# Patient Record
Sex: MALE | Race: WHITE | NOT HISPANIC OR LATINO | Employment: OTHER | URBAN - NONMETROPOLITAN AREA
[De-identification: names, ages, dates, MRNs, and addresses within clinical notes are randomized per-mention and may not be internally consistent; named-entity substitution may affect disease eponyms.]

---

## 2021-09-25 ENCOUNTER — APPOINTMENT (EMERGENCY)
Dept: RADIOLOGY | Facility: HOSPITAL | Age: 81
DRG: 682 | End: 2021-09-25
Payer: COMMERCIAL

## 2021-09-25 ENCOUNTER — APPOINTMENT (EMERGENCY)
Dept: CT IMAGING | Facility: HOSPITAL | Age: 81
DRG: 682 | End: 2021-09-25
Payer: COMMERCIAL

## 2021-09-25 ENCOUNTER — HOSPITAL ENCOUNTER (INPATIENT)
Facility: HOSPITAL | Age: 81
LOS: 2 days | Discharge: ANOTHER HOSPITAL | DRG: 682 | End: 2021-09-27
Attending: EMERGENCY MEDICINE | Admitting: FAMILY MEDICINE
Payer: COMMERCIAL

## 2021-09-25 DIAGNOSIS — N17.9 AKI (ACUTE KIDNEY INJURY) (HCC): ICD-10-CM

## 2021-09-25 DIAGNOSIS — R13.10 DYSPHAGIA: Primary | ICD-10-CM

## 2021-09-25 DIAGNOSIS — E46 PROTEIN-CALORIE MALNUTRITION, UNSPECIFIED SEVERITY (HCC): ICD-10-CM

## 2021-09-25 DIAGNOSIS — R19.5 HEME POSITIVE STOOL: ICD-10-CM

## 2021-09-25 DIAGNOSIS — R13.10 ODYNOPHAGIA: ICD-10-CM

## 2021-09-25 PROBLEM — I25.10 CORONARY ARTERY DISEASE INVOLVING NATIVE CORONARY ARTERY OF NATIVE HEART WITHOUT ANGINA PECTORIS: Status: ACTIVE | Noted: 2021-09-25

## 2021-09-25 PROBLEM — I25.5 ISCHEMIC CARDIOMYOPATHY: Status: ACTIVE | Noted: 2021-09-25

## 2021-09-25 PROBLEM — E11.9 TYPE 2 DIABETES MELLITUS, WITHOUT LONG-TERM CURRENT USE OF INSULIN (HCC): Status: ACTIVE | Noted: 2021-09-25

## 2021-09-25 PROBLEM — J44.9 COPD (CHRONIC OBSTRUCTIVE PULMONARY DISEASE) (HCC): Status: ACTIVE | Noted: 2021-09-25

## 2021-09-25 PROBLEM — I95.9 HYPOTENSION: Status: ACTIVE | Noted: 2021-09-25

## 2021-09-25 PROBLEM — F17.200 CURRENT SMOKER: Status: ACTIVE | Noted: 2021-09-25

## 2021-09-25 PROBLEM — N18.4 ACUTE RENAL FAILURE SUPERIMPOSED ON STAGE 4 CHRONIC KIDNEY DISEASE (HCC): Status: ACTIVE | Noted: 2021-09-25

## 2021-09-25 PROBLEM — D64.9 ANEMIA: Status: ACTIVE | Noted: 2021-09-25

## 2021-09-25 LAB
ALBUMIN SERPL BCP-MCNC: 2.9 G/DL (ref 3.5–5)
ALP SERPL-CCNC: 96 U/L (ref 46–116)
ALT SERPL W P-5'-P-CCNC: 24 U/L (ref 12–78)
ANION GAP SERPL CALCULATED.3IONS-SCNC: 12 MMOL/L (ref 4–13)
AST SERPL W P-5'-P-CCNC: 16 U/L (ref 5–45)
ATRIAL RATE: 64 BPM
BASOPHILS # BLD AUTO: 0.07 THOUSANDS/ΜL (ref 0–0.1)
BASOPHILS NFR BLD AUTO: 1 % (ref 0–1)
BILIRUB SERPL-MCNC: 0.33 MG/DL (ref 0.2–1)
BUN SERPL-MCNC: 78 MG/DL (ref 5–25)
CALCIUM ALBUM COR SERPL-MCNC: 9 MG/DL (ref 8.3–10.1)
CALCIUM SERPL-MCNC: 8.1 MG/DL (ref 8.3–10.1)
CHLORIDE SERPL-SCNC: 104 MMOL/L (ref 100–108)
CO2 SERPL-SCNC: 22 MMOL/L (ref 21–32)
CREAT SERPL-MCNC: 4.41 MG/DL (ref 0.6–1.3)
EOSINOPHIL # BLD AUTO: 0.14 THOUSAND/ΜL (ref 0–0.61)
EOSINOPHIL NFR BLD AUTO: 1 % (ref 0–6)
ERYTHROCYTE [DISTWIDTH] IN BLOOD BY AUTOMATED COUNT: 15.1 % (ref 11.6–15.1)
GFR SERPL CREATININE-BSD FRML MDRD: 12 ML/MIN/1.73SQ M
GLUCOSE SERPL-MCNC: 82 MG/DL (ref 65–140)
HCT VFR BLD AUTO: 30.7 % (ref 36.5–49.3)
HGB BLD-MCNC: 10 G/DL (ref 12–17)
IMM GRANULOCYTES # BLD AUTO: 0.11 THOUSAND/UL (ref 0–0.2)
IMM GRANULOCYTES NFR BLD AUTO: 1 % (ref 0–2)
LACTATE SERPL-SCNC: 0.9 MMOL/L (ref 0.5–2)
LYMPHOCYTES # BLD AUTO: 1 THOUSANDS/ΜL (ref 0.6–4.47)
LYMPHOCYTES NFR BLD AUTO: 7 % (ref 14–44)
MCH RBC QN AUTO: 31.3 PG (ref 26.8–34.3)
MCHC RBC AUTO-ENTMCNC: 32.6 G/DL (ref 31.4–37.4)
MCV RBC AUTO: 96 FL (ref 82–98)
MONOCYTES # BLD AUTO: 0.8 THOUSAND/ΜL (ref 0.17–1.22)
MONOCYTES NFR BLD AUTO: 6 % (ref 4–12)
NEUTROPHILS # BLD AUTO: 11.89 THOUSANDS/ΜL (ref 1.85–7.62)
NEUTS SEG NFR BLD AUTO: 84 % (ref 43–75)
NRBC BLD AUTO-RTO: 0 /100 WBCS
P AXIS: 66 DEGREES
PLATELET # BLD AUTO: 276 THOUSANDS/UL (ref 149–390)
PMV BLD AUTO: 10.3 FL (ref 8.9–12.7)
POTASSIUM SERPL-SCNC: 4.8 MMOL/L (ref 3.5–5.3)
PR INTERVAL: 204 MS
PROT SERPL-MCNC: 6.2 G/DL (ref 6.4–8.2)
QRS AXIS: 2 DEGREES
QRSD INTERVAL: 160 MS
QT INTERVAL: 458 MS
QTC INTERVAL: 472 MS
RBC # BLD AUTO: 3.2 MILLION/UL (ref 3.88–5.62)
S PYO DNA THROAT QL NAA+PROBE: NORMAL
SODIUM SERPL-SCNC: 138 MMOL/L (ref 136–145)
T WAVE AXIS: 180 DEGREES
TROPONIN I SERPL-MCNC: <0.02 NG/ML
VENTRICULAR RATE: 64 BPM
WBC # BLD AUTO: 14.01 THOUSAND/UL (ref 4.31–10.16)

## 2021-09-25 PROCEDURE — 99285 EMERGENCY DEPT VISIT HI MDM: CPT | Performed by: PHYSICIAN ASSISTANT

## 2021-09-25 PROCEDURE — 99223 1ST HOSP IP/OBS HIGH 75: CPT | Performed by: FAMILY MEDICINE

## 2021-09-25 PROCEDURE — 85025 COMPLETE CBC W/AUTO DIFF WBC: CPT | Performed by: PHYSICIAN ASSISTANT

## 2021-09-25 PROCEDURE — 83605 ASSAY OF LACTIC ACID: CPT | Performed by: PHYSICIAN ASSISTANT

## 2021-09-25 PROCEDURE — 36415 COLL VENOUS BLD VENIPUNCTURE: CPT | Performed by: PHYSICIAN ASSISTANT

## 2021-09-25 PROCEDURE — 99285 EMERGENCY DEPT VISIT HI MDM: CPT

## 2021-09-25 PROCEDURE — 84484 ASSAY OF TROPONIN QUANT: CPT | Performed by: PHYSICIAN ASSISTANT

## 2021-09-25 PROCEDURE — 96360 HYDRATION IV INFUSION INIT: CPT

## 2021-09-25 PROCEDURE — 71045 X-RAY EXAM CHEST 1 VIEW: CPT

## 2021-09-25 PROCEDURE — 80053 COMPREHEN METABOLIC PANEL: CPT | Performed by: PHYSICIAN ASSISTANT

## 2021-09-25 PROCEDURE — 93005 ELECTROCARDIOGRAM TRACING: CPT

## 2021-09-25 PROCEDURE — 87651 STREP A DNA AMP PROBE: CPT | Performed by: PHYSICIAN ASSISTANT

## 2021-09-25 PROCEDURE — 70490 CT SOFT TISSUE NECK W/O DYE: CPT

## 2021-09-25 RX ORDER — UMECLIDINIUM 62.5 UG/1
1 AEROSOL, POWDER ORAL DAILY
COMMUNITY

## 2021-09-25 RX ORDER — SPIRONOLACTONE 25 MG/1
25 TABLET ORAL EVERY OTHER DAY
Status: CANCELLED | OUTPATIENT
Start: 2021-09-25

## 2021-09-25 RX ORDER — SIMVASTATIN 20 MG
20 TABLET ORAL
COMMUNITY

## 2021-09-25 RX ORDER — CARVEDILOL 25 MG/1
25 TABLET ORAL 2 TIMES DAILY WITH MEALS
COMMUNITY

## 2021-09-25 RX ORDER — GLIMEPIRIDE 2 MG/1
2 TABLET ORAL
COMMUNITY

## 2021-09-25 RX ORDER — MIRTAZAPINE 15 MG/1
7.5 TABLET, FILM COATED ORAL 2 TIMES DAILY
COMMUNITY

## 2021-09-25 RX ORDER — ISOSORBIDE MONONITRATE 120 MG/1
120 TABLET, EXTENDED RELEASE ORAL DAILY
COMMUNITY

## 2021-09-25 RX ORDER — ALBUTEROL SULFATE 2.5 MG/3ML
3 SOLUTION RESPIRATORY (INHALATION) AS NEEDED
COMMUNITY

## 2021-09-25 RX ORDER — RAMIPRIL 10 MG/1
10 CAPSULE ORAL DAILY
COMMUNITY

## 2021-09-25 RX ORDER — FERROUS SULFATE 325(65) MG
325 TABLET ORAL
COMMUNITY

## 2021-09-25 RX ORDER — ALLOPURINOL 100 MG/1
100 TABLET ORAL DAILY
COMMUNITY

## 2021-09-25 RX ORDER — FUROSEMIDE 20 MG/1
20 TABLET ORAL DAILY
COMMUNITY

## 2021-09-25 RX ORDER — PREDNISONE 20 MG/1
40 TABLET ORAL DAILY
COMMUNITY

## 2021-09-25 RX ORDER — SPIRONOLACTONE 25 MG/1
25 TABLET ORAL EVERY OTHER DAY
COMMUNITY

## 2021-09-25 RX ORDER — ERGOCALCIFEROL (VITAMIN D2) 1250 MCG
50000 CAPSULE ORAL
COMMUNITY

## 2021-09-25 RX ORDER — ASPIRIN 81 MG/1
81 TABLET ORAL DAILY
COMMUNITY

## 2021-09-25 RX ORDER — AZITHROMYCIN 250 MG/1
250 TABLET, FILM COATED ORAL EVERY 24 HOURS
COMMUNITY

## 2021-09-25 RX ORDER — LINAGLIPTIN 5 MG/1
5 TABLET, FILM COATED ORAL DAILY
COMMUNITY

## 2021-09-25 RX ORDER — CALCITRIOL 0.25 UG/1
0.25 CAPSULE, LIQUID FILLED ORAL DAILY
COMMUNITY

## 2021-09-25 RX ORDER — ACETAMINOPHEN 500 MG
500 TABLET ORAL EVERY 6 HOURS PRN
COMMUNITY

## 2021-09-25 RX ORDER — ALBUTEROL SULFATE 90 UG/1
2 AEROSOL, METERED RESPIRATORY (INHALATION) EVERY 6 HOURS PRN
COMMUNITY

## 2021-09-25 RX ORDER — NITROGLYCERIN 0.4 MG/1
0.4 TABLET SUBLINGUAL
COMMUNITY

## 2021-09-25 RX ADMIN — SODIUM CHLORIDE 1000 ML: 0.9 INJECTION, SOLUTION INTRAVENOUS at 16:54

## 2021-09-25 RX ADMIN — SODIUM CHLORIDE 500 ML: 0.9 INJECTION, SOLUTION INTRAVENOUS at 19:42

## 2021-09-25 NOTE — ED PROVIDER NOTES
History  Chief Complaint   Patient presents with    Difficulty Swallowing     pt arrives reporting increasing difficulty with swallowing over the past two weeks  family reports he is now stating that it is too painful to eat or drink  70-year-old male presents the emergency department with family for evaluation of difficulty/painful swallowing  Son reports this started approximately 2 weeks ago and has been worsening since  States he has had significantly decreased p o  Intake due to pain  Patient denies any episode of getting food stuck or the sensations of a step in the throat  He reports pain is more right-sided than left-sided  Patient with chronic cough  Current everyday smoker  Denies any shortness of breath  Reports occasional chest pain over the past week  Denies any current chest pain  Patient denies any fevers or chills  He denies any abdominal pain nausea, vomiting, diarrhea  History provided by:  Patient  Sore Throat  Location:  Right  Quality:  Unable to specify  Severity:  Severe  Onset quality:  Gradual  Duration:  2 weeks  Timing:  Constant  Progression:  Worsening  Chronicity:  New  Relieved by:  None tried  Worsened by:  Drinking, eating, smoking and swallowing  Ineffective treatments:  None tried  Associated symptoms: chest pain    Associated symptoms: no abdominal pain, no adenopathy, no chills, no cough, no drooling, no ear discharge, no ear pain, no epistaxis, no eye discharge, no fever, no headaches, no neck stiffness, no night sweats, no plugged ear sensation, no postnasal drip, no rash, no rhinorrhea, no shortness of breath, no sinus congestion, no stridor, no trouble swallowing and no voice change        Prior to Admission Medications   Prescriptions Last Dose Informant Patient Reported?  Taking?   acetaminophen (TYLENOL) 500 mg tablet   Yes No   Sig: Take 500 mg by mouth every 6 (six) hours as needed   albuterol (2 5 mg/3 mL) 0 083 % nebulizer solution   Yes No   Sig: Inhale 3 mL as needed   albuterol (PROVENTIL HFA,VENTOLIN HFA) 90 mcg/act inhaler   Yes No   Sig: Inhale 2 puffs every 6 (six) hours as needed   allopurinol (ZYLOPRIM) 100 mg tablet   Yes Yes   Sig: Take 100 mg by mouth daily   aspirin (ECOTRIN LOW STRENGTH) 81 mg EC tablet   Yes No   Sig: Take 81 mg by mouth daily   azithromycin (ZITHROMAX) 250 mg tablet   Yes Yes   Sig: Take 250 mg by mouth every 24 hours   calcitriol (ROCALTROL) 0 25 mcg capsule   Yes Yes   Sig: Take 0 25 mcg by mouth daily   carvedilol (COREG) 25 mg tablet   Yes Yes   Sig: Take 25 mg by mouth 2 (two) times a day with meals   ergocalciferol (ERGOCALCIFEROL) 1 25 MG (77056 UT) capsule   Yes Yes   Sig: Take 50,000 Units by mouth every 30 (thirty) days   ferrous sulfate 325 (65 Fe) mg tablet   Yes Yes   Sig: Take 325 mg by mouth daily with breakfast   furosemide (LASIX) 20 mg tablet   Yes Yes   Sig: Take 20 mg by mouth daily   glimepiride (AMARYL) 2 mg tablet   Yes Yes   Sig: Take 2 mg by mouth 2 (two) times a day before meals   isosorbide mononitrate (IMDUR) 120 mg 24 hr tablet   Yes Yes   Sig: Take 120 mg by mouth daily   linaGLIPtin (Tradjenta) 5 MG TABS   Yes Yes   Sig: Take 5 mg by mouth daily   mirtazapine (REMERON) 15 mg tablet   Yes Yes   Sig: Take 7 5 mg by mouth 2 (two) times a day   nitroglycerin (NITROSTAT) 0 4 mg SL tablet   Yes Yes   Sig: Place 0 4 mg under the tongue every 5 (five) minutes as needed for chest pain   predniSONE 20 mg tablet   Yes Yes   Sig: Take 40 mg by mouth daily   ramipril (ALTACE) 10 MG capsule   Yes Yes   Sig: Take 10 mg by mouth daily   simvastatin (ZOCOR) 20 mg tablet   Yes Yes   Sig: Take 20 mg by mouth daily at bedtime   spironolactone (ALDACTONE) 25 mg tablet   Yes Yes   Sig: Take 25 mg by mouth every other day   ticagrelor 60 MG   Yes Yes   Sig: Take 60 mg by mouth every 12 (twelve) hours   umeclidinium bromide (Incruse Ellipta) 62 5 mcg/inh AEPB inhaler   Yes Yes   Sig: Inhale 1 puff daily Facility-Administered Medications: None       Past Medical History:   Diagnosis Date    [facility]     COPD (chronic obstructive pulmonary disease) (HCC)     Coronary artery disease     Diabetes mellitus ([address] )     Gout     High cholesterol     Hypertension     Renal disorder        Past Surgical History:   Procedure Laterality Date    CARDIAC DEFIBRILLATOR PLACEMENT      CARDIAC SURGERY      CATARACT EXTRACTION      FEMORAL ARTERY STENT         History reviewed  No pertinent family history  I have reviewed and agree with the history as documented  E-Cigarette/Vaping     E-Cigarette/Vaping Substances     Social History     Tobacco Use    Smoking status: Current Every Day Smoker    Smokeless tobacco: Never Used   Substance Use Topics    Alcohol use: Never    Drug use: Never       Review of Systems   Constitutional: Negative  Negative for appetite change, chills, diaphoresis, fatigue, fever and night sweats  HENT: Positive for sore throat  Negative for drooling, ear discharge, ear pain, nosebleeds, postnasal drip, rhinorrhea, trouble swallowing and voice change  Eyes: Negative for discharge  Respiratory: Negative  Negative for cough, shortness of breath and stridor  Cardiovascular: Positive for chest pain  Gastrointestinal: Negative  Negative for abdominal pain  Musculoskeletal: Negative  Negative for neck stiffness  Skin: Negative  Negative for rash  Neurological: Negative  Negative for headaches  Hematological: Negative for adenopathy  All other systems reviewed and are negative  Physical Exam  Physical Exam  Vitals and nursing note reviewed  Constitutional:       General: He is not in acute distress  Appearance: Normal appearance  He is normal weight  He is ill-appearing (Chronically ill-appearing)  He is not toxic-appearing  HENT:      Head: Normocephalic and atraumatic        Right Ear: Tympanic membrane, ear canal and external ear normal       Left Ear: Tympanic membrane, ear canal and external ear normal       Nose: Nose normal  No congestion or rhinorrhea  Mouth/Throat:      Mouth: Mucous membranes are moist       Pharynx: Oropharynx is clear  Posterior oropharyngeal erythema present  No oropharyngeal exudate  Eyes:      Extraocular Movements: Extraocular movements intact  Conjunctiva/sclera: Conjunctivae normal       Pupils: Pupils are equal, round, and reactive to light  Cardiovascular:      Rate and Rhythm: Normal rate and regular rhythm  Pulmonary:      Effort: Pulmonary effort is normal       Breath sounds: Decreased breath sounds and wheezing present  Abdominal:      General: Abdomen is flat  Bowel sounds are normal  There is no distension  Palpations: Abdomen is soft  Tenderness: There is no abdominal tenderness  There is no right CVA tenderness or left CVA tenderness  Musculoskeletal:         General: No swelling or tenderness  Normal range of motion  Cervical back: Normal range of motion and neck supple  Tenderness: right side of throat  Lymphadenopathy:      Cervical: No cervical adenopathy  Skin:     General: Skin is warm and dry  Capillary Refill: Capillary refill takes less than 2 seconds  Coloration: Skin is not jaundiced  Findings: No bruising, erythema or rash  Comments: Patient is ashen in color possibly cigarette smoke soot  Family reports normal color for patient   Neurological:      General: No focal deficit present  Mental Status: He is alert and oriented to person, place, and time  Mental status is at baseline     Psychiatric:         Mood and Affect: Mood normal          Behavior: Behavior normal          Vital Signs  ED Triage Vitals [09/25/21 1621]   Temperature Pulse Respirations Blood Pressure SpO2   97 5 °F (36 4 °C) 66 18 (!) 94/44 100 %      Temp Source Heart Rate Source Patient Position - Orthostatic VS BP Location FiO2 (%)   Temporal -- -- -- --      Pain Score       Worst Possible Pain           Vitals:    09/25/21 1630 09/25/21 1700 09/25/21 1715 09/25/21 1845   BP: (!) 86/42 (!) 90/45 104/52 103/51   Pulse: 67 66 65 65         Visual Acuity      ED Medications  Medications   sodium chloride 0 9 % bolus 1,000 mL (1,000 mL Intravenous New Bag 9/25/21 1654)       Diagnostic Studies  Results Reviewed     Procedure Component Value Units Date/Time    Strep A PCR [[id number]]  (Normal) Collected: 09/25/21 1745    Lab Status: Final result Specimen: Throat Updated: 09/25/21 1827     STREP A PCR None Detected    Lactic acid [[id number]]  (Normal) Collected: 09/25/21 1655    Lab Status: Final result Specimen: Blood from Arm, Right Updated: 09/25/21 1721     LACTIC ACID 0 9 mmol/L     Narrative:      Result may be elevated if tourniquet was used during collection      Troponin I [[id number]]  (Normal) Collected: 09/25/21 1655    Lab Status: Final result Specimen: Blood from Arm, Right Updated: 09/25/21 1721     Troponin I <0 02 ng/mL     Comprehensive metabolic panel [[id number]]  (Abnormal) Collected: 09/25/21 1655    Lab Status: Final result Specimen: Blood from Arm, Right Updated: 09/25/21 1716     Sodium 138 mmol/L      Potassium 4 8 mmol/L      Chloride 104 mmol/L      CO2 22 mmol/L      ANION GAP 12 mmol/L      BUN 78 mg/dL      Creatinine 4 41 mg/dL      Glucose 82 mg/dL      Calcium 8 1 mg/dL      Corrected Calcium 9 0 mg/dL      AST 16 U/L      ALT 24 U/L      Alkaline Phosphatase 96 U/L      Total Protein 6 2 g/dL      Albumin 2 9 g/dL      Total Bilirubin 0 33 mg/dL      eGFR 12 ml/min/1 73sq m     Narrative:      [place] guidelines for Chronic Kidney Disease (CKD):     Stage 1 with normal or high GFR (GFR > 90 mL/min/1 73 square meters)    Stage 2 Mild CKD (GFR = 60-89 mL/min/1 73 square meters)    Stage 3A Moderate CKD (GFR = 45-59 mL/min/1 73 square meters)    Stage 3B Moderate CKD (GFR = 30-44 mL/min/1 73 square meters)   Stage 4 Severe CKD (GFR = 15-29 mL/min/1 73 square meters)    Stage 5 End Stage CKD (GFR <15 mL/min/1 73 square meters)  Note: GFR calculation is accurate only with a steady state creatinine    CBC and differential [[id number]]  (Abnormal) Collected: 09/25/21 1655    Lab Status: Final result Specimen: Blood from Arm, Right Updated: 09/25/21 1700     WBC 14 01 Thousand/uL      RBC 3 20 Million/uL      Hemoglobin 10 0 g/dL      Hematocrit 30 7 %      MCV 96 fL      MCH 31 3 pg      MCHC 32 6 g/dL      RDW 15 1 %      MPV 10 3 fL      Platelets 391 Thousands/uL      nRBC 0 /100 WBCs      Neutrophils Relative 84 %      Immat GRANS % 1 %      Lymphocytes Relative 7 %      Monocytes Relative 6 %      Eosinophils Relative 1 %      Basophils Relative 1 %      Neutrophils Absolute 11 89 Thousands/µL      Immature Grans Absolute 0 11 Thousand/uL      Lymphocytes Absolute 1 00 Thousands/µL      Monocytes Absolute 0 80 Thousand/µL      Eosinophils Absolute 0 14 Thousand/µL      Basophils Absolute 0 07 Thousands/µL                  CT soft tissue neck wo contrast   Final Result by Guilherme Strange MD (09/25 1805)      No acute CT findings  Emphysematous lung changes                 Workstation performed: [id number]         XR chest 1 view portable    (Results Pending)   FL barium swallow ROUTINE    (Results Pending)              Procedures  ECG 12 Lead Documentation Only    Date/Time: 9/25/2021 4:38 PM  Performed by: David Arita PA-C  Authorized by: David Arita PA-C     ECG reviewed by me, the ED Provider: yes    Patient location:  ED  Interpretation:     Interpretation: non-specific    Rate:     ECG rate:  64    ECG rate assessment: normal    Rhythm:     Rhythm: sinus rhythm    Ectopy:     Ectopy: none    QRS:     QRS axis:  Normal    QRS intervals:  Normal  Conduction:     Conduction: abnormal      Abnormal conduction: complete LBBB    ST segments:     ST segments:  Normal  T waves:     T waves: normal               ED Course  ED Course as of Sep 25 1915   Sat Sep 25, 2021   1714 WBC(!): 14 01   1730 Was 2 89 in August 2021   Creatinine(!): 4 41   1807 CT soft tissue neck: No acute CT findings  Emphysematous lung changes  [facility] PCR: None Detected   1833 Patient is agreeable with admission      1901 Patient accepted by medicine  Of note son's #: [phone]                    MDM  Number of Diagnoses or Management Options  TRACEE (acute kidney injury) [facility]): new and requires workup  Dysphagia: new and requires workup  Diagnosis management comments: 49-year-old male presents emergency department for evaluation of difficult/painful swallowing x2 weeks with unknown origin  Vitals and medical record reviewed  Everyday smoker  No fevers or chills  One episode of vomiting last week  Significant decreased p o  Intake due to throat pain  Worse on right side than left  Vitals and medical record reviewed  Patient was hypotensive did improve minimally with fluids  Lungs decreased with mild amount of wheezing  Oropharynx noted for erythema no significant swelling, exudate  No cervical adenopathy but tenderness to the right side of the neck  EKG was nonischemic  Troponin negative  CT neck negative for acute findings  Strep negative  Patient was found have leukocytosis and significant TRACEE  Discussed with Medicine who accepted the patient for admission         Amount and/or Complexity of Data Reviewed  Clinical lab tests: ordered and reviewed  Tests in the radiology section of CPT®: ordered and reviewed  Review and summarize past medical records: yes  Discuss the patient with other providers: yes  Independent visualization of images, tracings, or specimens: yes        Disposition  Final diagnoses:   Dysphagia   TRACEE (acute kidney injury) ([facility] [address] )     Time reflects when diagnosis was documented in both MDM as applicable and the Disposition within this note     Time User Action Codes Description Comment    9/25/2021  6:41 PM Puneet Johnston [R13 10] Dysphagia     9/25/2021  6:41 PM Olya Dutton [N17 9] TRACEE (acute kidney injury) [facility])       ED Disposition     ED Disposition Condition Date/Time Comment    Admit Stable Sat Sep 25, 2021  6:41 PM Case was discussed with Dr Adelina Dunbar and the patient's admission status was agreed to be Admission Status: inpatient status to the service of Dr Adelina Dunbar   Follow-up Information    None         Patient's Medications   Discharge Prescriptions    No medications on file     No discharge procedures on file      PDMP Review     None          ED Provider  Electronically Signed by           Amaury Ernst PA-C  09/25/21 3644

## 2021-09-26 ENCOUNTER — APPOINTMENT (INPATIENT)
Dept: CT IMAGING | Facility: HOSPITAL | Age: 81
DRG: 682 | End: 2021-09-26
Payer: COMMERCIAL

## 2021-09-26 ENCOUNTER — APPOINTMENT (INPATIENT)
Dept: ULTRASOUND IMAGING | Facility: HOSPITAL | Age: 81
DRG: 682 | End: 2021-09-26
Payer: COMMERCIAL

## 2021-09-26 PROBLEM — R19.5 HEME POSITIVE STOOL: Status: ACTIVE | Noted: 2021-09-26

## 2021-09-26 PROBLEM — E43 SEVERE PROTEIN-CALORIE MALNUTRITION (HCC): Status: ACTIVE | Noted: 2021-09-25

## 2021-09-26 LAB
ALBUMIN SERPL BCP-MCNC: 2.3 G/DL (ref 3.5–5)
ALP SERPL-CCNC: 77 U/L (ref 46–116)
ALT SERPL W P-5'-P-CCNC: 18 U/L (ref 12–78)
ANION GAP SERPL CALCULATED.3IONS-SCNC: 12 MMOL/L (ref 4–13)
AST SERPL W P-5'-P-CCNC: 14 U/L (ref 5–45)
BACTERIA UR QL AUTO: ABNORMAL /HPF
BASOPHILS # BLD AUTO: 0.04 THOUSANDS/ΜL (ref 0–0.1)
BASOPHILS NFR BLD AUTO: 0 % (ref 0–1)
BILIRUB SERPL-MCNC: 0.21 MG/DL (ref 0.2–1)
BILIRUB UR QL STRIP: NEGATIVE
BUN SERPL-MCNC: 66 MG/DL (ref 5–25)
CALCIUM ALBUM COR SERPL-MCNC: 8.8 MG/DL (ref 8.3–10.1)
CALCIUM SERPL-MCNC: 7.4 MG/DL (ref 8.3–10.1)
CHLORIDE SERPL-SCNC: 109 MMOL/L (ref 100–108)
CLARITY UR: CLEAR
CO2 SERPL-SCNC: 21 MMOL/L (ref 21–32)
COLOR UR: YELLOW
CREAT SERPL-MCNC: 3.46 MG/DL (ref 0.6–1.3)
CREAT UR-MCNC: 92.5 MG/DL
EOSINOPHIL # BLD AUTO: 0.1 THOUSAND/ΜL (ref 0–0.61)
EOSINOPHIL NFR BLD AUTO: 1 % (ref 0–6)
ERYTHROCYTE [DISTWIDTH] IN BLOOD BY AUTOMATED COUNT: 15.3 % (ref 11.6–15.1)
EST. AVERAGE GLUCOSE BLD GHB EST-MCNC: 128 MG/DL
FERRITIN SERPL-MCNC: 103 NG/ML (ref 8–388)
GFR SERPL CREATININE-BSD FRML MDRD: 16 ML/MIN/1.73SQ M
GLUCOSE SERPL-MCNC: 104 MG/DL (ref 65–140)
GLUCOSE SERPL-MCNC: 145 MG/DL (ref 65–140)
GLUCOSE SERPL-MCNC: 162 MG/DL (ref 65–140)
GLUCOSE SERPL-MCNC: 57 MG/DL (ref 65–140)
GLUCOSE SERPL-MCNC: 58 MG/DL (ref 65–140)
GLUCOSE SERPL-MCNC: 62 MG/DL (ref 65–140)
GLUCOSE SERPL-MCNC: 63 MG/DL (ref 65–140)
GLUCOSE SERPL-MCNC: 81 MG/DL (ref 65–140)
GLUCOSE UR STRIP-MCNC: NEGATIVE MG/DL
HBA1C MFR BLD: 6.1 %
HCT VFR BLD AUTO: 26 % (ref 36.5–49.3)
HCT VFR BLD AUTO: 27.8 % (ref 36.5–49.3)
HGB BLD-MCNC: 8.5 G/DL (ref 12–17)
HGB BLD-MCNC: 8.9 G/DL (ref 12–17)
HGB UR QL STRIP.AUTO: NEGATIVE
HYALINE CASTS #/AREA URNS LPF: ABNORMAL /LPF
IMM GRANULOCYTES # BLD AUTO: 0.08 THOUSAND/UL (ref 0–0.2)
IMM GRANULOCYTES NFR BLD AUTO: 1 % (ref 0–2)
IRON SATN MFR SERPL: 16 % (ref 20–50)
IRON SERPL-MCNC: 28 UG/DL (ref 65–175)
KETONES UR STRIP-MCNC: NEGATIVE MG/DL
LEUKOCYTE ESTERASE UR QL STRIP: NEGATIVE
LYMPHOCYTES # BLD AUTO: 0.77 THOUSANDS/ΜL (ref 0.6–4.47)
LYMPHOCYTES NFR BLD AUTO: 8 % (ref 14–44)
MCH RBC QN AUTO: 31.5 PG (ref 26.8–34.3)
MCHC RBC AUTO-ENTMCNC: 32.7 G/DL (ref 31.4–37.4)
MCV RBC AUTO: 96 FL (ref 82–98)
MONOCYTES # BLD AUTO: 0.5 THOUSAND/ΜL (ref 0.17–1.22)
MONOCYTES NFR BLD AUTO: 5 % (ref 4–12)
NEUTROPHILS # BLD AUTO: 7.77 THOUSANDS/ΜL (ref 1.85–7.62)
NEUTS SEG NFR BLD AUTO: 85 % (ref 43–75)
NITRITE UR QL STRIP: NEGATIVE
NON-SQ EPI CELLS URNS QL MICRO: ABNORMAL /HPF
NRBC BLD AUTO-RTO: 0 /100 WBCS
PH UR STRIP.AUTO: 5 [PH]
PLATELET # BLD AUTO: 216 THOUSANDS/UL (ref 149–390)
PMV BLD AUTO: 10.7 FL (ref 8.9–12.7)
POTASSIUM SERPL-SCNC: 4.4 MMOL/L (ref 3.5–5.3)
PROT SERPL-MCNC: 5.1 G/DL (ref 6.4–8.2)
PROT UR STRIP-MCNC: NEGATIVE MG/DL
RBC # BLD AUTO: 2.7 MILLION/UL (ref 3.88–5.62)
RBC #/AREA URNS AUTO: ABNORMAL /HPF
SODIUM 24H UR-SCNC: 51 MOL/L
SODIUM SERPL-SCNC: 142 MMOL/L (ref 136–145)
SP GR UR STRIP.AUTO: 1.02 (ref 1–1.03)
TIBC SERPL-MCNC: 176 UG/DL (ref 250–450)
UROBILINOGEN UR QL STRIP.AUTO: 0.2 E.U./DL
VIT B12 SERPL-MCNC: 615 PG/ML (ref 100–900)
WBC # BLD AUTO: 9.26 THOUSAND/UL (ref 4.31–10.16)
WBC #/AREA URNS AUTO: ABNORMAL /HPF

## 2021-09-26 PROCEDURE — 85018 HEMOGLOBIN: CPT | Performed by: NURSE PRACTITIONER

## 2021-09-26 PROCEDURE — 83550 IRON BINDING TEST: CPT | Performed by: FAMILY MEDICINE

## 2021-09-26 PROCEDURE — 84300 ASSAY OF URINE SODIUM: CPT | Performed by: NURSE PRACTITIONER

## 2021-09-26 PROCEDURE — 36415 COLL VENOUS BLD VENIPUNCTURE: CPT | Performed by: NURSE PRACTITIONER

## 2021-09-26 PROCEDURE — 76770 US EXAM ABDO BACK WALL COMP: CPT

## 2021-09-26 PROCEDURE — 83036 HEMOGLOBIN GLYCOSYLATED A1C: CPT | Performed by: NURSE PRACTITIONER

## 2021-09-26 PROCEDURE — 81001 URINALYSIS AUTO W/SCOPE: CPT | Performed by: NURSE PRACTITIONER

## 2021-09-26 PROCEDURE — 82948 REAGENT STRIP/BLOOD GLUCOSE: CPT

## 2021-09-26 PROCEDURE — 71250 CT THORAX DX C-: CPT

## 2021-09-26 PROCEDURE — 85025 COMPLETE CBC W/AUTO DIFF WBC: CPT | Performed by: NURSE PRACTITIONER

## 2021-09-26 PROCEDURE — 82728 ASSAY OF FERRITIN: CPT | Performed by: FAMILY MEDICINE

## 2021-09-26 PROCEDURE — 83540 ASSAY OF IRON: CPT | Performed by: FAMILY MEDICINE

## 2021-09-26 PROCEDURE — 82570 ASSAY OF URINE CREATININE: CPT | Performed by: NURSE PRACTITIONER

## 2021-09-26 PROCEDURE — C9113 INJ PANTOPRAZOLE SODIUM, VIA: HCPCS | Performed by: FAMILY MEDICINE

## 2021-09-26 PROCEDURE — 80053 COMPREHEN METABOLIC PANEL: CPT | Performed by: NURSE PRACTITIONER

## 2021-09-26 PROCEDURE — 82607 VITAMIN B-12: CPT | Performed by: FAMILY MEDICINE

## 2021-09-26 PROCEDURE — 85014 HEMATOCRIT: CPT | Performed by: NURSE PRACTITIONER

## 2021-09-26 PROCEDURE — 74176 CT ABD & PELVIS W/O CONTRAST: CPT

## 2021-09-26 PROCEDURE — 82272 OCCULT BLD FECES 1-3 TESTS: CPT | Performed by: FAMILY MEDICINE

## 2021-09-26 PROCEDURE — G1004 CDSM NDSC: HCPCS

## 2021-09-26 PROCEDURE — 99223 1ST HOSP IP/OBS HIGH 75: CPT | Performed by: NURSE PRACTITIONER

## 2021-09-26 PROCEDURE — 99233 SBSQ HOSP IP/OBS HIGH 50: CPT | Performed by: FAMILY MEDICINE

## 2021-09-26 RX ORDER — DEXTROSE MONOHYDRATE 25 G/50ML
25 INJECTION, SOLUTION INTRAVENOUS ONCE
Status: COMPLETED | OUTPATIENT
Start: 2021-09-26 | End: 2021-09-26

## 2021-09-26 RX ORDER — ACETAMINOPHEN 325 MG/1
650 TABLET ORAL EVERY 6 HOURS PRN
Status: DISCONTINUED | OUTPATIENT
Start: 2021-09-26 | End: 2021-09-27 | Stop reason: HOSPADM

## 2021-09-26 RX ORDER — ALBUTEROL SULFATE 90 UG/1
2 AEROSOL, METERED RESPIRATORY (INHALATION) EVERY 6 HOURS PRN
Status: DISCONTINUED | OUTPATIENT
Start: 2021-09-26 | End: 2021-09-27 | Stop reason: HOSPADM

## 2021-09-26 RX ORDER — CARVEDILOL 3.12 MG/1
6.25 TABLET ORAL 2 TIMES DAILY WITH MEALS
Status: DISCONTINUED | OUTPATIENT
Start: 2021-09-26 | End: 2021-09-27 | Stop reason: HOSPADM

## 2021-09-26 RX ORDER — ISOSORBIDE MONONITRATE 30 MG/1
60 TABLET, EXTENDED RELEASE ORAL DAILY
Status: DISCONTINUED | OUTPATIENT
Start: 2021-09-27 | End: 2021-09-27 | Stop reason: HOSPADM

## 2021-09-26 RX ORDER — ALLOPURINOL 100 MG/1
100 TABLET ORAL DAILY
Status: DISCONTINUED | OUTPATIENT
Start: 2021-09-26 | End: 2021-09-27 | Stop reason: HOSPADM

## 2021-09-26 RX ORDER — ISOSORBIDE MONONITRATE 30 MG/1
60 TABLET, EXTENDED RELEASE ORAL DAILY
Status: DISCONTINUED | OUTPATIENT
Start: 2021-09-27 | End: 2021-09-26

## 2021-09-26 RX ORDER — MIRTAZAPINE 15 MG/1
7.5 TABLET, FILM COATED ORAL 2 TIMES DAILY
Status: DISCONTINUED | OUTPATIENT
Start: 2021-09-26 | End: 2021-09-27 | Stop reason: HOSPADM

## 2021-09-26 RX ORDER — ISOSORBIDE MONONITRATE 30 MG/1
120 TABLET, EXTENDED RELEASE ORAL DAILY
Status: DISCONTINUED | OUTPATIENT
Start: 2021-09-26 | End: 2021-09-26

## 2021-09-26 RX ORDER — CARVEDILOL 6.25 MG/1
6.25 TABLET ORAL 2 TIMES DAILY WITH MEALS
Status: DISCONTINUED | OUTPATIENT
Start: 2021-09-26 | End: 2021-09-26

## 2021-09-26 RX ORDER — DEXTROSE AND SODIUM CHLORIDE 5; .9 G/100ML; G/100ML
50 INJECTION, SOLUTION INTRAVENOUS CONTINUOUS
Status: DISCONTINUED | OUTPATIENT
Start: 2021-09-26 | End: 2021-09-27

## 2021-09-26 RX ORDER — ALBUTEROL SULFATE 2.5 MG/3ML
2.5 SOLUTION RESPIRATORY (INHALATION) EVERY 6 HOURS PRN
Status: DISCONTINUED | OUTPATIENT
Start: 2021-09-26 | End: 2021-09-27 | Stop reason: HOSPADM

## 2021-09-26 RX ORDER — ASPIRIN 81 MG/1
81 TABLET ORAL DAILY
Status: DISCONTINUED | OUTPATIENT
Start: 2021-09-26 | End: 2021-09-27

## 2021-09-26 RX ORDER — CARVEDILOL 12.5 MG/1
25 TABLET ORAL 2 TIMES DAILY WITH MEALS
Status: DISCONTINUED | OUTPATIENT
Start: 2021-09-26 | End: 2021-09-26

## 2021-09-26 RX ORDER — SODIUM CHLORIDE, SODIUM GLUCONATE, SODIUM ACETATE, POTASSIUM CHLORIDE, MAGNESIUM CHLORIDE, SODIUM PHOSPHATE, DIBASIC, AND POTASSIUM PHOSPHATE .53; .5; .37; .037; .03; .012; .00082 G/100ML; G/100ML; G/100ML; G/100ML; G/100ML; G/100ML; G/100ML
50 INJECTION, SOLUTION INTRAVENOUS CONTINUOUS
Status: DISCONTINUED | OUTPATIENT
Start: 2021-09-26 | End: 2021-09-26

## 2021-09-26 RX ORDER — PANTOPRAZOLE SODIUM 40 MG/1
40 INJECTION, POWDER, FOR SOLUTION INTRAVENOUS EVERY 12 HOURS SCHEDULED
Status: DISCONTINUED | OUTPATIENT
Start: 2021-09-26 | End: 2021-09-27 | Stop reason: HOSPADM

## 2021-09-26 RX ORDER — HEPARIN SODIUM 5000 [USP'U]/ML
5000 INJECTION, SOLUTION INTRAVENOUS; SUBCUTANEOUS EVERY 8 HOURS SCHEDULED
Status: DISCONTINUED | OUTPATIENT
Start: 2021-09-26 | End: 2021-09-26

## 2021-09-26 RX ADMIN — ISOSORBIDE MONONITRATE 120 MG: 30 TABLET, EXTENDED RELEASE ORAL at 08:48

## 2021-09-26 RX ADMIN — MIRTAZAPINE 7.5 MG: 15 TABLET, FILM COATED ORAL at 21:18

## 2021-09-26 RX ADMIN — PANTOPRAZOLE SODIUM 40 MG: 40 INJECTION, POWDER, FOR SOLUTION INTRAVENOUS at 11:54

## 2021-09-26 RX ADMIN — ASPIRIN 81 MG: 81 TABLET, COATED ORAL at 08:48

## 2021-09-26 RX ADMIN — DEXTROSE AND SODIUM CHLORIDE 50 ML/HR: 5; .9 INJECTION, SOLUTION INTRAVENOUS at 18:41

## 2021-09-26 RX ADMIN — DEXTROSE MONOHYDRATE 25 ML: 25 INJECTION, SOLUTION INTRAVENOUS at 07:49

## 2021-09-26 RX ADMIN — PANTOPRAZOLE SODIUM 40 MG: 40 INJECTION, POWDER, FOR SOLUTION INTRAVENOUS at 21:20

## 2021-09-26 RX ADMIN — DEXTROSE MONOHYDRATE 25 ML: 500 INJECTION PARENTERAL at 18:30

## 2021-09-26 RX ADMIN — IPRATROPIUM BROMIDE 1 PUFF: 17 AEROSOL, METERED RESPIRATORY (INHALATION) at 08:49

## 2021-09-26 RX ADMIN — Medication 16 G: at 18:08

## 2021-09-26 RX ADMIN — ALLOPURINOL 100 MG: 100 TABLET ORAL at 08:48

## 2021-09-26 RX ADMIN — TICAGRELOR 60 MG: 60 TABLET ORAL at 21:20

## 2021-09-26 RX ADMIN — HEPARIN SODIUM 5000 UNITS: 5000 INJECTION INTRAVENOUS; SUBCUTANEOUS at 06:37

## 2021-09-26 RX ADMIN — IPRATROPIUM BROMIDE 1 PUFF: 17 AEROSOL, METERED RESPIRATORY (INHALATION) at 21:20

## 2021-09-26 RX ADMIN — IOHEXOL 50 ML: 240 INJECTION, SOLUTION INTRATHECAL; INTRAVASCULAR; INTRAVENOUS; ORAL at 13:30

## 2021-09-26 RX ADMIN — MIRTAZAPINE 7.5 MG: 15 TABLET, FILM COATED ORAL at 08:49

## 2021-09-26 RX ADMIN — SODIUM CHLORIDE, SODIUM GLUCONATE, SODIUM ACETATE, POTASSIUM CHLORIDE, MAGNESIUM CHLORIDE, SODIUM PHOSPHATE, DIBASIC, AND POTASSIUM PHOSPHATE 50 ML/HR: .53; .5; .37; .037; .03; .012; .00082 INJECTION, SOLUTION INTRAVENOUS at 04:02

## 2021-09-26 RX ADMIN — CARVEDILOL 25 MG: 12.5 TABLET, FILM COATED ORAL at 07:49

## 2021-09-26 RX ADMIN — TICAGRELOR 60 MG: 60 TABLET ORAL at 13:29

## 2021-09-26 NOTE — ASSESSMENT & PLAN NOTE
· Creatinine 4 41  Was 2 89 in August 2021   Suspect multifactorial due to decreased oral intake and diuretic/ACE-inhibitor use  · Isolyte 50 mL/hr  · Recheck metabolic panel and trend creatinine  · Avoid nephrotoxic agents, hypotension  · Nephrology consult

## 2021-09-26 NOTE — ASSESSMENT & PLAN NOTE
· Pressure was low yesterday in the 80s  Received IV fluid boluses    Still low normal   Continue gentle hydration and decreased doses of antihypertensive medications and hold Lasix and ACE-inhibitor

## 2021-09-26 NOTE — ASSESSMENT & PLAN NOTE
· History of ICD  · EF from last echo in records 35%  · Continue isosorbide, carvedilol, ticagrelor  · Diuretics and ACE-inhibitor on hold due to TRACEE

## 2021-09-26 NOTE — PROGRESS NOTES
[address]  Progress Note - Emyradha Going [birth year], 80 y o  male MRN: [id number]  Unit/Bed#: ED 08 Encounter: [id number]  Primary Care Provider: Jacques Olivo MD   Date and time admitted to hospital: 9/25/2021  4:16 PM    * Acute renal failure superimposed on stage 4 chronic kidney disease ([facility] [address] )  Assessment & Plan  · Creatinine 4 41  Was 2 89 in August 2021  Suspect multifactorial due to decreased oral intake and diuretic/ACE-inhibitor use  · Isolyte 50 mL/hr  · Recheck metabolic panel and trend creatinine  · Avoid nephrotoxic agents, hypotension  · Nephrology consult    Odynophagia  Assessment & Plan  · Reports pain with swallowing in the lower neck/upper chest   Currently tolerating liquids with no problems  · Patient has lost around 12-15 lb over the last few weeks due to decreased oral intake  · Strep A: negative  · Upon exam patient sitting upright and eating a burrito and barbecue sandwich  · CT soft tissue neck:  No acute CT findings  · Speech eval and GI eval  · Will do CT chest abdomen pelvis for further evaluation as unable to get barium swallow due to no radiologist in department for 48 hours  · Keep NPO after midnight and plan for potential EGD in the morning    Hypotension  Assessment & Plan  · Pressure was low yesterday in the 80s  Received IV fluid boluses    Still low normal   Continue gentle hydration and decreased doses of antihypertensive medications and hold Lasix and ACE-inhibitor    Current smoker  Assessment & Plan  · Declines patch  · Encourage cessation    Type 2 diabetes mellitus, without long-term current use of insulin [facility])  Assessment & Plan  No results found for: HGBA1C    Recent Labs     09/26/21  0636 09/26/21  0820 09/26/21  0905   POCGLU 58* 145* 104       Blood Sugar Average: Last 72 hrs:  (P) 656 [id number]   · NPO  · Fingerstick blood sugar every 6 hours with sliding scale coverage  · Check hemoglobin A1c      Severe protein-calorie malnutrition [facility])  Assessment & Plan  Malnutrition Findings:           BMI Findings: Body mass index is 19 96 kg/m²  · As evidenced by insufficient protein calorie intake and muscle wasting  · Nutrition consult    Ischemic cardiomyopathy  Assessment & Plan  · History of ICD  · EF from last echo in records 35%  · Continue isosorbide, carvedilol, ticagrelor  Decrease dose of Coreg and isosorbide due to hypotension  · Diuretics and ACE-inhibitor on hold due to TRACEE    COPD (chronic obstructive pulmonary disease) ([facility])  Assessment & Plan  · Does not appear to be in exacerbation  · Patient has just completed prednisone 40 mg-suspect WBC count of 14 is in part due to this  · Continue home medications  · Respiratory protocol    Acute on chronic anemia  Assessment & Plan  ·  baseline appears to be around 13  · He had an iron panel at [facility] in July which is relatively unremarkable  · Hemoglobin 10 on presentation now dropped to 8 5  · Continue iron when no longer NPO  · Order stool Hemoccult and iron panel and B12 level  · Only transfuse if hemoglobin drops less than 7 5  Placed on Protonix 40 mg IV b i d       VTE Pharmacologic Prophylaxis:   Pharmacologic: Pharmacologic VTE Prophylaxis contraindicated due to acute on chronic anemia  Mechanical VTE Prophylaxis in Place: Yes    Patient Centered Rounds: I have performed bedside rounds with nursing staff today  Discussions with Specialists or Other Care Team Provider:  None    Education and Discussions with Family / Patient:  Discussed with patient at bedside    Time Spent for Care: 45 minutes  More than 50% of total time spent on counseling and coordination of care as described above      Current Length of Stay: 1 day(s)    Current Patient Status: Inpatient   Certification Statement: The patient will continue to require additional inpatient hospital stay due to Acute kidney injury    Discharge Plan:  Pending progress    Code Status: Level 1 - Full Code      Subjective:   Patient denies any chest pain however complains of difficulty swallowing and feels like the food is getting stuck in the lower part of his neck and upper chest   feels a little bit better today compared to before    Objective:     Vitals:   Temp (24hrs), Av 5 °F (36 4 °C), Min:97 5 °F (36 4 °C), Max:97 5 °F (36 4 °C)    Temp:  [97 5 °F (36 4 °C)] 97 5 °F (36 4 °C)  HR:  [65-80] 72  Resp:  [17-22] 18  BP: ()/(42-83) 111/51  SpO2:  [95 %-100 %] 97 %  Body mass index is 19 96 kg/m²  Input and Output Summary (last 24 hours): Intake/Output Summary (Last 24 hours) at 2021 1051  Last data filed at 2021 2042  Gross per 24 hour   Intake 1500 ml   Output --   Net 1500 ml       Physical Exam:     Physical Exam  Vitals and nursing note reviewed  Constitutional:       Appearance: He is ill-appearing  Comments: Severe generalized muscle wasting noted   HENT:      Head: Normocephalic and atraumatic  Right Ear: External ear normal       Left Ear: External ear normal       Nose: Nose normal       Mouth/Throat:      Pharynx: Oropharynx is clear  Eyes:      Pupils: Pupils are equal, round, and reactive to light  Cardiovascular:      Rate and Rhythm: Normal rate and regular rhythm  Heart sounds: Normal heart sounds  Pulmonary:      Effort: Pulmonary effort is normal       Breath sounds: Normal breath sounds  Abdominal:      General: Bowel sounds are normal       Palpations: Abdomen is soft  Tenderness: There is no abdominal tenderness  Musculoskeletal:         General: Normal range of motion  Cervical back: Normal range of motion and neck supple  Skin:     General: Skin is warm and dry  Capillary Refill: Capillary refill takes less than 2 seconds  Neurological:      General: No focal deficit present  Mental Status: He is alert and oriented to person, place, and time     Psychiatric:         Mood and Affect: Mood normal  Additional Data:     Labs:    Results from last 7 days   Lab Units 09/26/21  0539   WBC Thousand/uL 9 26   HEMOGLOBIN g/dL 8 5*   HEMATOCRIT % 26 0*   PLATELETS Thousands/uL 216   NEUTROS PCT % 85*   LYMPHS PCT % 8*   MONOS PCT % 5   EOS PCT % 1     Results from last 7 days   Lab Units 09/26/21  0539   SODIUM mmol/L 142   POTASSIUM mmol/L 4 4   CHLORIDE mmol/L 109*   CO2 mmol/L 21   BUN mg/dL 66*   CREATININE mg/dL 3 46*   ANION GAP mmol/L 12   CALCIUM mg/dL 7 4*   ALBUMIN g/dL 2 3*   TOTAL BILIRUBIN mg/dL 0 21   ALK PHOS U/L 77   ALT U/L 18   AST U/L 14   GLUCOSE RANDOM mg/dL 81         Results from last 7 days   Lab Units 09/26/21  0905 09/26/21  0820 09/26/21  0636   POC GLUCOSE mg/dl 104 145* 58*         Results from last 7 days   Lab Units 09/25/21  1655   LACTIC ACID mmol/L 0 9           * I Have Reviewed All Lab Data Listed Above  * Additional Pertinent Lab Tests Reviewed:  [address] Admission Reviewed    Imaging:    Imaging Reports Reviewed Today Include: ct neck  Imaging Personally Reviewed by Myself Includes:  Ordered CT chest abdomen pelvis    Recent Cultures (last 7 days):           Last 24 Hours Medication List:   Current Facility-Administered Medications   Medication Dose Route Frequency Provider Last Rate    acetaminophen  650 mg Oral Q6H PRN MICAH Decker      albuterol  2 puff Inhalation Q6H PRN MICAH Decker      albuterol  2 5 mg Nebulization Q6H PRN MICAH Decker      allopurinol  100 mg Oral Daily MICAH Decker      aspirin  81 mg Oral Daily MICAH Decker      carvedilol  6 25 mg Oral BID With Meals Kenya Jaimes MD      heparin (porcine)  5,000 Units Subcutaneous [facility] MICAH Decker      insulin lispro  1-5 Units Subcutaneous Q6H [facility] MICAH Decker      ipratropium  1 puff Inhalation Q12H [address] [place] [address] Avenue, CRNP      [START ON 9/27/2021] isosorbide mononitrate  60 mg Oral Daily Kenya Jaimes MD  mirtazapine  7 5 mg Oral BID MICAH Decker      multi-electrolyte  50 mL/hr Intravenous Continuous Anna MICAH Mittal 50 mL/hr (09/26/21 0402)    ticagrelor  60 mg Oral Q12H [address], CRNP          Today, Patient Was Seen By: Kenya Jaimes MD    ** Please Note: Dictation voice to text software may have been used in the creation of this document   **

## 2021-09-26 NOTE — ED NOTES
Dr Wallace Marin made aware of patients hypoglycemia and that we are following the protocol        Bhaskar Fragoso RN  09/26/21 1374

## 2021-09-26 NOTE — ED NOTES
Left message on both spouse's and son's amach asking if she could bring in pt's medications rx ticagrelor 60mg tab per pharmacy request due to med not available inhouse          Alia Guerrier RN  09/26/21 3386

## 2021-09-26 NOTE — ASSESSMENT & PLAN NOTE
· Reports pain with swallowing  · Strep A: negative  · Upon exam patient sitting upright and eating a burrito and barbecue sandwich  Advised of recommended NPO status  · CT soft tissue neck:  No acute CT findings    · Barium swallow ordered in ER-pending  · Speech eval  · NPO  · Aspiration precautions

## 2021-09-26 NOTE — ASSESSMENT & PLAN NOTE
· Reports pain with swallowing in the lower neck/upper chest   Currently tolerating liquids with no problems  · Patient has lost around 12-15 lb over the last few weeks due to decreased oral intake  · Strep A: negative  · Upon exam patient sitting upright and eating a burrito and barbecue sandwich  · CT soft tissue neck:  No acute CT findings  · Speech eval and GI eval  · Will do CT chest abdomen pelvis for further evaluation as unable to get barium swallow due to no radiologist in department for 48 hours    · Keep NPO after midnight and plan for potential EGD in the morning

## 2021-09-26 NOTE — ASSESSMENT & PLAN NOTE
· Does not appear to be in exacerbation  · Patient has just completed prednisone 40 mg-suspect WBC count of 14 is in part due to this  · Continue home medications  · Respiratory protocol

## 2021-09-26 NOTE — CONSULTS
Deborah Delgado Tankred 80 y o  male MRN: [id number]  Unit/Bed#: ED 08 Encounter: [id number]        Assessment and Plan:    1  Acute kidney injury (POA) atop chronic kidney disease  · Presented with a creatinine of 4 4 mg/dL -> 3 4 mg/dL today  Etiology likely prerenal azotemia in the setting of volume depletion, +/- failure to autoregulate in the setting of ramipril and spironolactone use  No imaging to review  No urine studies reviewed  · Plan:  · Obtain renal ultrasound, urine chemistries, bladder scan  · Continue gentle volume expansion with Isolyte  · Continue to hold Lasix, spironolactone, ramipril  · Avoid potential nephrotoxins including contrast, maximize hemodynamics with mean arterial pressure goal greater than 60 mmHg  2  Stage 4 chronic kidney disease with baseline creatinine suspected around 2 2-2 6 mg/dL  · Follows with [facility]  3  Volume depletion with hypotension  · Spironolactone and Lasix remains appropriately on hold  Continue gentle volume expansion with Isolyte until able to eat and drink appropriately  4  Type 2 diabetes mellitus  · Oral agents on hold, care according to primary team, no proteinuria noted on UA  5  Ischemic cardiomyopathy with LVEF 35%  · Hold Ace inhibitor  Gently volume expand and monitor for signs of volume overload  6  Anemia  · Hemoglobin down trending, likely hemodilution all  Transfuse for hemoglobin less than 7 0 grams/deciliter  HPI:    Kaleigh Banegas is a 80 y o  male with an active problem list significant for CKD 4, type 2 diabetes mellitus, current smoker, ischemic cardiomyopathy with an LVEF 48%, chronic systolic congestive heart failure, hypertension, COPD who presented to [address] [place] [address] emergency department 9/25 with chief complaint of decreased oral intake, difficulty swallowing  Upon arrival noncontrast CT neck negative  Creatinine 4 4 mg/dL prompting a renal consultation    He was provided with volume expansion  Upon discussion with the patient, he relates HPI as above however he has very poor historian  He states that he has been having trouble swallowing however it seems better now  He told me to call his son who is a paramedic for more details  Per patient's son, José Benton, patient has been having decreased oral intake for the last 2-3 weeks and difficulty swallowing  More recently it has been painful to swallow  Two weeks ago the patient was treated for an upper respiratory infection with azithromycin  Per patient's son he reports that patient has chronic kidney disease stage 4 and follows with [facility]  He does not know of any family history of chronic kidney disease  Patient does not take NSAIDs  Patient does take ACE-inhibitor  Limited labs to review but suspect creatinine baseline is in the 2s  Reason for Consult:  Acute kidney injury atop chronic kidney disease    Review of Systems:  A complete 10-point review of systems was performed  Aside from what was mentioned in the HPI, it is otherwise negative  Historical Information   Past Medical History:   Diagnosis Date    [facility]     COPD [facility] disease) [facility]     Coronary artery disease     Diabetes mellitus [facility] [address] )     Gout     High cholesterol     Hypertension     Renal disorder      Past Surgical History:   Procedure Laterality Date    CARDIAC DEFIBRILLATOR PLACEMENT      CARDIAC SURGERY      CATARACT EXTRACTION      FEMORAL ARTERY STENT       Social History   Social History     Substance and Sexual Activity   Alcohol Use Never     Social History     Substance and Sexual Activity   Drug Use Never     Social History     Tobacco Use   Smoking Status Current Every Day Smoker   Smokeless Tobacco Never Used       Family History:   History reviewed  No pertinent family history      Medications:  Pertinent medications were reviewed  Current Facility-Administered Medications   Medication Dose Route Frequency Provider Last Rate    acetaminophen  650 mg Oral Q6H PRN Huma Giovanna, CRNP      albuterol  2 puff Inhalation Q6H PRN Huma Giovanna, CRNP      albuterol  2 5 mg Nebulization Q6H PRN Huma Giovanna, CRNP      allopurinol  100 mg Oral Daily Huma Giovanna, CRNP      aspirin  81 mg Oral Daily Huma Giovanna, CRNP      carvedilol  25 mg Oral BID With Meals Huma Heredia, CRNP      heparin (porcine)  5,000 Units Subcutaneous [facility] Giov[facility], CRNP      insulin lispro  1-5 Units Subcutaneous Q6H [address] [facility], CRNP      ipratropium  1 puff Inhalation Q12H [address] [facility], CRNP      isosorbide mononitrate  120 mg Oral Daily Huma Giovanna, CRNP      mirtazapine  7 5 mg Oral BID Huma Giovanna, CRNP      multi-electrolyte  50 mL/hr Intravenous Continuous Huma Giovanna, CRNP 50 mL/hr (09/26/21 0402)    ticagrelor  60 mg Oral Q12H [address] [facility], CRNP           No Known Allergies      Vitals:   /53 (BP Location: Right arm)   Pulse 79   Temp 97 5 °F (36 4 °C) (Temporal)   Resp 19   Ht 5' 9" (1 753 m)   Wt 61 3 kg (135 lb 2 3 oz)   SpO2 99%   BMI 19 96 kg/m²   Body mass index is 19 96 kg/m²    SpO2: 99 %,   SpO2 Activity: At Rest,   O2 Device: None (Room air)      Intake/Output Summary (Last 24 hours) at 9/26/2021 0948  Last data filed at 9/25/2021 2042  Gross per 24 hour   Intake 1500 ml   Output --   Net 1500 ml     Invasive Devices     Peripheral Intravenous Line            Peripheral IV 09/25/21 Right Antecubital <1 day                Physical Exam:  General: conscious, cooperative, in no acute distress  Eyes: conjunctivae pink, anicteric sclerae  ENT: lips and mucous membranes moist  Neck: supple, no JVD, no masses  Chest: essentially clear breath sounds bilateral, no crackles, ronchus or wheezings  CVS: S1 & S2, normal rate, regular rhythm  Abdomen: soft, non-tender, non-distended, normoactive bowel sounds  Extremities: thin, no edema of both legs  Skin: no rash  Neuro: awake, alert, oriented      Diagnostic Data:  Lab: I have personally reviewed pertinent lab results  ,   CBC:  Results from last 7 days   Lab Units 09/26/21  0539   WBC Thousand/uL 9 26   HEMOGLOBIN g/dL 8 5*   HEMATOCRIT % 26 0*   PLATELETS Thousands/uL 216      CMP:   Lab Results   Component Value Date    SODIUM 142 09/26/2021    K 4 4 09/26/2021     (H) 09/26/2021    CO2 21 09/26/2021    BUN 66 (H) 09/26/2021    CREATININE 3 46 (H) 09/26/2021    CALCIUM 7 4 (L) 09/26/2021    AST 14 09/26/2021    ALT 18 09/26/2021    ALKPHOS 77 09/26/2021    EGFR 16 09/26/2021   ,   PT/INR: No results found for: PT, INR,   Magnesium: No components found for: MAG,  Phosphorous: No results found for: PHOS    Microbiology:  @LABRCNTIP,(urinecx:7)@        MICAH Cosme    Portions of the record may have been created with voice recognition software  Occasional wrong word or "sound a like" substitutions may have occurred due to the inherent limitations of voice recognition software  Read the chart carefully and recognize, using context, where substitutions have occurred

## 2021-09-26 NOTE — ASSESSMENT & PLAN NOTE
· Hemoglobin 10   His baseline appears to be around 13  · He had an iron panel at [facility] in July which is relatively unremarkable  · Trend hemoglobin  · Continue iron when no longer NPO

## 2021-09-26 NOTE — H&P
[address] Milner  H&P- Smitha Ortiz [birth year], 80 y o  male MRN: [id number]  Unit/Bed#: ED 08 Encounter: [id number]  Primary Care Provider: Curt Ozuna MD   Date and time admitted to hospital: 9/25/2021  4:16 PM    * Acute renal failure superimposed on stage 4 chronic kidney disease ([facility] )  Assessment & Plan  · Creatinine 4 41  Was 2 89 in August 2021  Suspect multifactorial due to decreased oral intake and diuretic/ACE-inhibitor use  · Isolyte 50 mL/hr  · Recheck metabolic panel and trend creatinine  · Avoid nephrotoxic agents, hypotension  · Nephrology consult    Dysphagia  Assessment & Plan  · Reports pain with swallowing  · Strep A: negative  · Upon exam patient sitting upright and eating a burrito and barbecue sandwich  Advised of recommended NPO status  · CT soft tissue neck:  No acute CT findings  · Barium swallow ordered in ER-pending  · Speech eval  · NPO  · Aspiration precautions    Hypotension  Assessment & Plan  · BP soft  · Medications with hold parameters  · Maintain gentle hydration  · Monitor blood pressure    Protein-calorie malnutrition (HCC)  Assessment & Plan  Malnutrition Findings:           BMI Findings: Body mass index is 19 96 kg/m²  · As evidenced by insufficient protein calorie intake and muscle wasting  · Nutrition consult    Current smoker  Assessment & Plan  · Declines patch  · Encourage cessation    Type 2 diabetes mellitus, without long-term current use of insulin ([place] [address] )  Assessment & Plan  No results found for: HGBA1C    No results for input(s): POCGLU in the last 72 hours      Blood Sugar Average: Last 72 hrs:     · NPO  · Fingerstick blood sugar every 6 hours with sliding scale coverage  · Check hemoglobin A1c      Ischemic cardiomyopathy  Assessment & Plan  · History of ICD  · EF from last echo in records 35%  · Continue isosorbide, carvedilol, ticagrelor  · Diuretics and ACE-inhibitor on hold due to TRACEE    COPD (chronic obstructive pulmonary disease) ([facility] [address] )  Assessment & Plan  · Does not appear to be in exacerbation  · Patient has just completed prednisone 40 mg-suspect WBC count of 14 is in part due to this  · Continue home medications  · Respiratory protocol    Anemia  Assessment & Plan  · Hemoglobin 10  His baseline appears to be around 13  · He had an iron panel at [facility] in July which is relatively unremarkable  · Trend hemoglobin  · Continue iron when no longer NPO    VTE Prophylaxis: Heparin  / sequential compression device   Code Status:  Full  POLST: POLST form is not discussed and not completed at this time  Discussion with family:  Patient    Anticipated Length of Stay:  Patient will be admitted on an Inpatient basis with an anticipated length of stay of  greater than 2 midnights  Justification for Hospital Stay:  Per plan above    Total Time for Visit, including Counseling / Coordination of Care: 45 minutes  Greater than 50% of this total time spent on direct patient counseling and coordination of care  Chief Complaint:   Painful swallowing    History of Present Illness: Griffin Diaz is a 80 y o  male with history of ischemic cardiomyopathy with ICD, COPD, CAD, non-insulin-dependent type 2 diabetes, CKD 4, essential hypertension, current smoker, and gout who presents with painful swallowing  He says it has been going on for about 2 weeks and is getting worse  He said it was gradual onset and severe  It is affecting how much he can eat and drink  Upon exam, patient is sitting upright and eating a burrito and a barbecue sandwich  I advised him that he will be NPO pending imaging/evaluation  He says that he has not choked, but the food is painful going down  He says it feels like something gets stuck  He denies fever or chills, nasal congestion, drooling, shortness of breath, chest pain, vomiting, hematemesis, abdominal pain, back pain, diarrhea, flank pain, dizziness, syncope, or focal weakness       Review of Systems:    Review of Systems   Constitutional: Negative for chills and fever  HENT: Positive for trouble swallowing  Eyes: Negative for visual disturbance  Respiratory: Negative for cough and shortness of breath  Cardiovascular: Negative for chest pain, palpitations and leg swelling  Gastrointestinal: Negative for abdominal distention, abdominal pain, blood in stool, constipation, diarrhea, nausea and vomiting  Genitourinary: Negative for dysuria and flank pain  Musculoskeletal: Negative for arthralgias and myalgias  Skin: Negative for pallor and rash  Neurological: Negative for dizziness, seizures, syncope, weakness, numbness and headaches  All other systems reviewed and are negative  Past Medical and Surgical History:     Past Medical History:   Diagnosis Date    [facility] )     [facility]     Coronary artery disease     Diabetes mellitus ([place] [address] )     Gout     High cholesterol     Hypertension     Renal disorder        Past Surgical History:   Procedure Laterality Date    CARDIAC DEFIBRILLATOR PLACEMENT      CARDIAC SURGERY      CATARACT EXTRACTION      FEMORAL ARTERY STENT         Meds/Allergies:    Prior to Admission medications    Medication Sig Start Date End Date Taking?  Authorizing Provider   allopurinol (ZYLOPRIM) 100 mg tablet Take 100 mg by mouth daily   Yes Historical Provider, MD   azithromycin (ZITHROMAX) 250 mg tablet Take 250 mg by mouth every 24 hours   Yes Historical Provider, MD   calcitriol (ROCALTROL) 0 25 mcg capsule Take 0 25 mcg by mouth daily   Yes Historical Provider, MD   carvedilol (COREG) 25 mg tablet Take 25 mg by mouth 2 (two) times a day with meals   Yes Historical Provider, MD   ergocalciferol (ERGOCALCIFEROL) 1 25 MG (42978 UT) capsule Take 50,000 Units by mouth every 30 (thirty) days   Yes Historical Provider, MD   ferrous sulfate 325 (65 Fe) mg tablet Take 325 mg by mouth daily with breakfast   Yes Historical Provider, MD   furosemide (LASIX) 20 mg tablet Take 20 mg by mouth daily   Yes Historical Provider, MD   glimepiride (AMARYL) 2 mg tablet Take 2 mg by mouth 2 (two) times a day before meals   Yes Historical Provider, MD   isosorbide mononitrate (IMDUR) 120 mg 24 hr tablet Take 120 mg by mouth daily   Yes Historical Provider, MD   linaGLIPtin (Tradjenta) 5 MG TABS Take 5 mg by mouth daily   Yes Historical Provider, MD   mirtazapine (REMERON) 15 mg tablet Take 7 5 mg by mouth 2 (two) times a day   Yes Historical Provider, MD   nitroglycerin (NITROSTAT) 0 4 mg SL tablet Place 0 4 mg under the tongue every 5 (five) minutes as needed for chest pain   Yes Historical Provider, MD   predniSONE 20 mg tablet Take 40 mg by mouth daily   Yes Historical Provider, MD   ramipril (ALTACE) 10 MG capsule Take 10 mg by mouth daily   Yes Historical Provider, MD   simvastatin (ZOCOR) 20 mg tablet Take 20 mg by mouth daily at bedtime   Yes Historical Provider, MD   spironolactone (ALDACTONE) 25 mg tablet Take 25 mg by mouth every other day   Yes Historical Provider, MD   ticagrelor 60 MG Take 60 mg by mouth every 12 (twelve) hours   Yes Historical Provider, MD   umeclidinium bromide (Incruse Ellipta) 62 5 mcg/inh AEPB inhaler Inhale 1 puff daily   Yes Historical Provider, MD   acetaminophen (TYLENOL) 500 mg tablet Take 500 mg by mouth every 6 (six) hours as needed    Historical Provider, MD   albuterol (2 5 mg/3 mL) 0 083 % nebulizer solution Inhale 3 mL as needed    Historical Provider, MD   albuterol (PROVENTIL HFA,VENTOLIN HFA) 90 mcg/act inhaler Inhale 2 puffs every 6 (six) hours as needed    Historical Provider, MD   aspirin (ECOTRIN LOW STRENGTH) 81 mg EC tablet Take 81 mg by mouth daily    Historical Provider, MD     I have reviewed home medications with patient family member      Allergies: No Known Allergies    Social History:     Marital Status: /Civil Union   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/  Patient Pre-hospital Living Situation:  Home  Patient Pre-hospital Level of Mobility:  Independent  Patient Pre-hospital Diet Restrictions:  Cardiac diabetic  Substance Use History:   Social History     Substance and Sexual Activity   Alcohol Use Never     Social History     Tobacco Use   Smoking Status Current Every Day Smoker   Smokeless Tobacco Never Used     Social History     Substance and Sexual Activity   Drug Use Never       Family History:    non-contributory    Physical Exam:     Vitals:   Blood Pressure: (!) 108/43 (09/25/21 2130)  Pulse: 79 (09/25/21 2130)  Temperature: 97 5 °F (36 4 °C) (09/25/21 1621)  Temp Source: Temporal (09/25/21 1621)  Respirations: 21 (09/25/21 2130)  Height: 5' 9" (175 3 cm) (09/25/21 1621)  Weight - Scale: 61 3 kg (135 lb 2 3 oz) (09/25/21 1621)  SpO2: 98 % (09/25/21 2130)    Physical Exam  Vitals and nursing note reviewed  Constitutional:       Appearance: He is underweight  HENT:      Head: Normocephalic and atraumatic  Mouth/Throat:      Mouth: Mucous membranes are moist       Pharynx: Oropharynx is clear  Comments: Airway patent  Eyes:      Extraocular Movements: Extraocular movements intact  Pupils: Pupils are equal, round, and reactive to light  Cardiovascular:      Rate and Rhythm: Normal rate and regular rhythm  Pulses: Normal pulses  Heart sounds: Normal heart sounds  Pulmonary:      Effort: Pulmonary effort is normal       Breath sounds: Decreased breath sounds and wheezing present  Comments: Wheeze left base  Abdominal:      General: Bowel sounds are normal       Palpations: Abdomen is soft  Tenderness: There is no abdominal tenderness  Musculoskeletal:         General: Normal range of motion  Cervical back: Normal range of motion and neck supple  Right lower leg: No edema  Left lower leg: No edema  Skin:     General: Skin is warm and dry        Capillary Refill: Capillary refill takes less than 2 seconds  Coloration: Skin is ashen  Comments: Dry and ashen skin about the face and neck   Neurological:      General: No focal deficit present  Mental Status: He is alert and oriented to person, place, and time  Additional Data:     Lab Results: I have personally reviewed pertinent reports  Results from last 7 days   Lab Units 09/25/21  1655   WBC Thousand/uL 14 01*   HEMOGLOBIN g/dL 10 0*   HEMATOCRIT % 30 7*   PLATELETS Thousands/uL 276   NEUTROS PCT % 84*   LYMPHS PCT % 7*   MONOS PCT % 6   EOS PCT % 1     Results from last 7 days   Lab Units 09/25/21  1655   SODIUM mmol/L 138   POTASSIUM mmol/L 4 8   CHLORIDE mmol/L 104   CO2 mmol/L 22   BUN mg/dL 78*   CREATININE mg/dL 4 41*   ANION GAP mmol/L 12   CALCIUM mg/dL 8 1*   ALBUMIN g/dL 2 9*   TOTAL BILIRUBIN mg/dL 0 33   ALK PHOS U/L 96   ALT U/L 24   AST U/L 16   GLUCOSE RANDOM mg/dL 82                 Results from last 7 days   Lab Units 09/25/21  1655   LACTIC ACID mmol/L 0 9       Imaging: I have personally reviewed pertinent reports  CT soft tissue neck wo contrast   Final Result by Emma Cardoso MD (09/25 1805)      No acute CT findings  Emphysematous lung changes  Workstation performed: [id number]         XR chest 1 view portable    (Results Pending)   FL barium swallow ROUTINE    (Results Pending)       EKG, Pathology, and Other Studies Reviewed on Admission:   · EKG:  NSR rate of 64    Allscripts / Epic Records Reviewed: Yes     ** Please Note: This note has been constructed using a voice recognition system   **

## 2021-09-26 NOTE — ASSESSMENT & PLAN NOTE
No results found for: HGBA1C    Recent Labs     09/26/21  0636 09/26/21  0820 09/26/21  0905   POCGLU 58* 145* 104       Blood Sugar Average: Last 72 hrs:  (P) 151 [id number]   · NPO  · Fingerstick blood sugar every 6 hours with sliding scale coverage  · Check hemoglobin A1c

## 2021-09-26 NOTE — ASSESSMENT & PLAN NOTE
· History of ICD  · EF from last echo in records 35%  · Continue isosorbide, carvedilol, ticagrelor    Decrease dose of Coreg and isosorbide due to hypotension  · Diuretics and ACE-inhibitor on hold due to TRACEE

## 2021-09-26 NOTE — ASSESSMENT & PLAN NOTE
No results found for: HGBA1C    No results for input(s): POCGLU in the last 72 hours      Blood Sugar Average: Last 72 hrs:     · NPO  · Fingerstick blood sugar every 6 hours with sliding scale coverage  · Check hemoglobin A1c

## 2021-09-26 NOTE — ASSESSMENT & PLAN NOTE
·  baseline appears to be around 13  · He had an iron panel at [facility] in July which is relatively unremarkable  · Hemoglobin 10 on presentation now dropped to 8 5  · Continue iron when no longer NPO  · Order stool Hemoccult and iron panel and B12 level  · Only transfuse if hemoglobin drops less than 7 5  Placed on Protonix 40 mg IV b i d

## 2021-09-26 NOTE — ASSESSMENT & PLAN NOTE
· BP soft  · Medications with hold parameters  · Maintain gentle hydration  · Monitor blood pressure

## 2021-09-26 NOTE — ASSESSMENT & PLAN NOTE
Malnutrition Findings:           BMI Findings: Body mass index is 19 96 kg/m²       · As evidenced by insufficient protein calorie intake and muscle wasting  · Nutrition consult

## 2021-09-27 ENCOUNTER — ANESTHESIA EVENT (INPATIENT)
Dept: GASTROENTEROLOGY | Facility: HOSPITAL | Age: 81
DRG: 682 | End: 2021-09-27
Payer: COMMERCIAL

## 2021-09-27 ENCOUNTER — APPOINTMENT (INPATIENT)
Dept: GASTROENTEROLOGY | Facility: HOSPITAL | Age: 81
DRG: 682 | End: 2021-09-27
Attending: INTERNAL MEDICINE
Payer: COMMERCIAL

## 2021-09-27 ENCOUNTER — TELEPHONE (OUTPATIENT)
Dept: GASTROENTEROLOGY | Facility: HOSPITAL | Age: 81
End: 2021-09-27

## 2021-09-27 ENCOUNTER — ANESTHESIA (INPATIENT)
Dept: GASTROENTEROLOGY | Facility: HOSPITAL | Age: 81
DRG: 682 | End: 2021-09-27
Payer: COMMERCIAL

## 2021-09-27 VITALS
SYSTOLIC BLOOD PRESSURE: 149 MMHG | HEART RATE: 75 BPM | RESPIRATION RATE: 18 BRPM | TEMPERATURE: 98.3 F | BODY MASS INDEX: 19.99 KG/M2 | HEIGHT: 69 IN | WEIGHT: 135 LBS | OXYGEN SATURATION: 97 % | DIASTOLIC BLOOD PRESSURE: 61 MMHG

## 2021-09-27 LAB
ALBUMIN SERPL BCP-MCNC: 2.5 G/DL (ref 3.5–5)
ALP SERPL-CCNC: 80 U/L (ref 46–116)
ALT SERPL W P-5'-P-CCNC: 17 U/L (ref 12–78)
ANION GAP SERPL CALCULATED.3IONS-SCNC: 12 MMOL/L (ref 4–13)
AST SERPL W P-5'-P-CCNC: 11 U/L (ref 5–45)
BILIRUB SERPL-MCNC: 0.26 MG/DL (ref 0.2–1)
BUN SERPL-MCNC: 46 MG/DL (ref 5–25)
CALCIUM ALBUM COR SERPL-MCNC: 9.1 MG/DL (ref 8.3–10.1)
CALCIUM SERPL-MCNC: 7.9 MG/DL (ref 8.3–10.1)
CHLORIDE SERPL-SCNC: 111 MMOL/L (ref 100–108)
CO2 SERPL-SCNC: 21 MMOL/L (ref 21–32)
CREAT SERPL-MCNC: 2.48 MG/DL (ref 0.6–1.3)
ERYTHROCYTE [DISTWIDTH] IN BLOOD BY AUTOMATED COUNT: 15.3 % (ref 11.6–15.1)
GFR SERPL CREATININE-BSD FRML MDRD: 23 ML/MIN/1.73SQ M
GLUCOSE SERPL-MCNC: 77 MG/DL (ref 65–140)
GLUCOSE SERPL-MCNC: 87 MG/DL (ref 65–140)
GLUCOSE SERPL-MCNC: 90 MG/DL (ref 65–140)
GLUCOSE SERPL-MCNC: 92 MG/DL (ref 65–140)
GLUCOSE SERPL-MCNC: 92 MG/DL (ref 65–140)
GLUCOSE SERPL-MCNC: 97 MG/DL (ref 65–140)
HCT VFR BLD AUTO: 27.4 % (ref 36.5–49.3)
HGB BLD-MCNC: 8.9 G/DL (ref 12–17)
MAGNESIUM SERPL-MCNC: 2.4 MG/DL (ref 1.6–2.6)
MCH RBC QN AUTO: 31.3 PG (ref 26.8–34.3)
MCHC RBC AUTO-ENTMCNC: 32.5 G/DL (ref 31.4–37.4)
MCV RBC AUTO: 97 FL (ref 82–98)
PLATELET # BLD AUTO: 219 THOUSANDS/UL (ref 149–390)
PMV BLD AUTO: 10.2 FL (ref 8.9–12.7)
POTASSIUM SERPL-SCNC: 4.6 MMOL/L (ref 3.5–5.3)
PROT SERPL-MCNC: 5.2 G/DL (ref 6.4–8.2)
RBC # BLD AUTO: 2.84 MILLION/UL (ref 3.88–5.62)
SARS-COV-2 RNA RESP QL NAA+PROBE: NEGATIVE
SODIUM SERPL-SCNC: 144 MMOL/L (ref 136–145)
WBC # BLD AUTO: 8.04 THOUSAND/UL (ref 4.31–10.16)

## 2021-09-27 PROCEDURE — 99239 HOSP IP/OBS DSCHRG MGMT >30: CPT | Performed by: FAMILY MEDICINE

## 2021-09-27 PROCEDURE — 80053 COMPREHEN METABOLIC PANEL: CPT | Performed by: NURSE PRACTITIONER

## 2021-09-27 PROCEDURE — 85027 COMPLETE CBC AUTOMATED: CPT | Performed by: FAMILY MEDICINE

## 2021-09-27 PROCEDURE — U0005 INFEC AGEN DETEC AMPLI PROBE: HCPCS | Performed by: FAMILY MEDICINE

## 2021-09-27 PROCEDURE — 0DJ08ZZ INSPECTION OF UPPER INTESTINAL TRACT, VIA NATURAL OR ARTIFICIAL OPENING ENDOSCOPIC: ICD-10-PCS | Performed by: INTERNAL MEDICINE

## 2021-09-27 PROCEDURE — 83735 ASSAY OF MAGNESIUM: CPT | Performed by: NURSE PRACTITIONER

## 2021-09-27 PROCEDURE — U0003 INFECTIOUS AGENT DETECTION BY NUCLEIC ACID (DNA OR RNA); SEVERE ACUTE RESPIRATORY SYNDROME CORONAVIRUS 2 (SARS-COV-2) (CORONAVIRUS DISEASE [COVID-19]), AMPLIFIED PROBE TECHNIQUE, MAKING USE OF HIGH THROUGHPUT TECHNOLOGIES AS DESCRIBED BY CMS-2020-01-R: HCPCS | Performed by: FAMILY MEDICINE

## 2021-09-27 PROCEDURE — C9113 INJ PANTOPRAZOLE SODIUM, VIA: HCPCS | Performed by: FAMILY MEDICINE

## 2021-09-27 PROCEDURE — 82948 REAGENT STRIP/BLOOD GLUCOSE: CPT

## 2021-09-27 PROCEDURE — 99232 SBSQ HOSP IP/OBS MODERATE 35: CPT | Performed by: INTERNAL MEDICINE

## 2021-09-27 RX ORDER — FENTANYL CITRATE 50 UG/ML
INJECTION, SOLUTION INTRAMUSCULAR; INTRAVENOUS AS NEEDED
Status: DISCONTINUED | OUTPATIENT
Start: 2021-09-27 | End: 2021-09-27

## 2021-09-27 RX ORDER — PANTOPRAZOLE SODIUM 40 MG/1
40 INJECTION, POWDER, FOR SOLUTION INTRAVENOUS EVERY 12 HOURS SCHEDULED
Status: CANCELLED | OUTPATIENT
Start: 2021-09-27

## 2021-09-27 RX ORDER — ALLOPURINOL 100 MG/1
100 TABLET ORAL DAILY
Status: CANCELLED | OUTPATIENT
Start: 2021-09-28

## 2021-09-27 RX ORDER — DEXTROSE AND SODIUM CHLORIDE 5; .9 G/100ML; G/100ML
25 INJECTION, SOLUTION INTRAVENOUS CONTINUOUS
Status: DISCONTINUED | OUTPATIENT
Start: 2021-09-27 | End: 2021-09-27 | Stop reason: HOSPADM

## 2021-09-27 RX ORDER — CARVEDILOL 3.12 MG/1
6.25 TABLET ORAL 2 TIMES DAILY WITH MEALS
Status: CANCELLED | OUTPATIENT
Start: 2021-09-27

## 2021-09-27 RX ORDER — PROPOFOL 10 MG/ML
INJECTION, EMULSION INTRAVENOUS AS NEEDED
Status: DISCONTINUED | OUTPATIENT
Start: 2021-09-27 | End: 2021-09-27

## 2021-09-27 RX ORDER — ALBUTEROL SULFATE 90 UG/1
2 AEROSOL, METERED RESPIRATORY (INHALATION) EVERY 6 HOURS PRN
Status: CANCELLED | OUTPATIENT
Start: 2021-09-27

## 2021-09-27 RX ORDER — DEXTROSE AND SODIUM CHLORIDE 5; .9 G/100ML; G/100ML
25 INJECTION, SOLUTION INTRAVENOUS CONTINUOUS
Status: CANCELLED | OUTPATIENT
Start: 2021-09-27

## 2021-09-27 RX ORDER — ISOSORBIDE MONONITRATE 30 MG/1
60 TABLET, EXTENDED RELEASE ORAL DAILY
Status: CANCELLED | OUTPATIENT
Start: 2021-09-28

## 2021-09-27 RX ORDER — ALBUTEROL SULFATE 2.5 MG/3ML
2.5 SOLUTION RESPIRATORY (INHALATION) EVERY 6 HOURS PRN
Status: CANCELLED | OUTPATIENT
Start: 2021-09-27

## 2021-09-27 RX ORDER — SODIUM CHLORIDE, SODIUM LACTATE, POTASSIUM CHLORIDE, CALCIUM CHLORIDE 600; 310; 30; 20 MG/100ML; MG/100ML; MG/100ML; MG/100ML
INJECTION, SOLUTION INTRAVENOUS CONTINUOUS PRN
Status: DISCONTINUED | OUTPATIENT
Start: 2021-09-27 | End: 2021-09-27

## 2021-09-27 RX ORDER — ACETAMINOPHEN 325 MG/1
650 TABLET ORAL EVERY 6 HOURS PRN
Status: CANCELLED | OUTPATIENT
Start: 2021-09-27

## 2021-09-27 RX ORDER — MIRTAZAPINE 15 MG/1
7.5 TABLET, FILM COATED ORAL
Status: CANCELLED | OUTPATIENT
Start: 2021-09-27

## 2021-09-27 RX ADMIN — FENTANYL CITRATE 25 MCG: 50 INJECTION, SOLUTION INTRAMUSCULAR; INTRAVENOUS at 14:33

## 2021-09-27 RX ADMIN — MIRTAZAPINE 7.5 MG: 15 TABLET, FILM COATED ORAL at 09:13

## 2021-09-27 RX ADMIN — CARVEDILOL 6.25 MG: 6.25 TABLET, FILM COATED ORAL at 09:13

## 2021-09-27 RX ADMIN — ISOSORBIDE MONONITRATE 60 MG: 30 TABLET, EXTENDED RELEASE ORAL at 09:13

## 2021-09-27 RX ADMIN — IPRATROPIUM BROMIDE 1 PUFF: 17 AEROSOL, METERED RESPIRATORY (INHALATION) at 09:14

## 2021-09-27 RX ADMIN — ALLOPURINOL 100 MG: 100 TABLET ORAL at 09:13

## 2021-09-27 RX ADMIN — SODIUM CHLORIDE, SODIUM LACTATE, POTASSIUM CHLORIDE, AND CALCIUM CHLORIDE: .6; .31; .03; .02 INJECTION, SOLUTION INTRAVENOUS at 14:29

## 2021-09-27 RX ADMIN — PROPOFOL 30 MG: 10 INJECTION, EMULSION INTRAVENOUS at 14:40

## 2021-09-27 RX ADMIN — PROPOFOL 50 MG: 10 INJECTION, EMULSION INTRAVENOUS at 14:34

## 2021-09-27 RX ADMIN — CARVEDILOL 6.25 MG: 6.25 TABLET, FILM COATED ORAL at 16:35

## 2021-09-27 RX ADMIN — PANTOPRAZOLE SODIUM 40 MG: 40 INJECTION, POWDER, FOR SOLUTION INTRAVENOUS at 09:14

## 2021-09-27 RX ADMIN — PROPOFOL 30 MG: 10 INJECTION, EMULSION INTRAVENOUS at 14:37

## 2021-09-27 RX ADMIN — DEXTROSE AND SODIUM CHLORIDE 25 ML/HR: 5; .9 INJECTION, SOLUTION INTRAVENOUS at 16:35

## 2021-09-27 NOTE — ASSESSMENT & PLAN NOTE
· History of ICD  · EF from last echo in records 35% from 1 year ago  · Continue isosorbide, carvedilol, ticagrelor    Decrease dose of Coreg and isosorbide due to hypotension  · Diuretics and ACE-inhibitor on hold due to TRACEE

## 2021-09-27 NOTE — MALNUTRITION/BMI
This medical record reflects one or more clinical indicators suggestive of malnutrition  Malnutrition Findings:   Adult Malnutrition type: Acute illness (related to odynophagia as evidenced by 8 8% weight loss x 3 mo (6/29/21 148lb, 9/25/21 135lb), severe muscle loss to interroseous, quadriceps and gastrocnemius muscles, moderate muscle loss to pectoralis, deltoid   )  Adult Degree of Malnutrition: Other severe protein calorie malnutrition ( and temporalis muscles, moderate fat loss to orbitals and triceps  Treated with: Dysphagia diet per ST + no further diet restrictions once medically appropriate for diet  Will add supplements as diet advances  Recommend daily weights )  Malnutrition Characteristics: Fat loss, Muscle loss, Weight loss    BMI Findings: Body mass index is 19 96 kg/m²  See Nutrition note dated 9/27/21 for additional details  Completed nutrition assessment is viewable in the nutrition documentation

## 2021-09-27 NOTE — ASSESSMENT & PLAN NOTE
· Creatinine 4 41  Was 2 89 in August 2021  Suspect multifactorial due to decreased oral intake and diuretic/ACE-inhibitor use  · Received IV fluid hydration following which creatinine improved to 2 48  · Avoid nephrotoxic agents, hypotension  · Nephrology consult    Will use fluids in case patient states NPO

## 2021-09-27 NOTE — QUICK NOTE
Patient with heme-positive stool per nursing  Will monitor H&H and transfuse if less than 7 5 and obtain EGD tomorrow

## 2021-09-27 NOTE — ANESTHESIA PREPROCEDURE EVALUATION
Procedure:  EGD    Relevant Problems   ENDO   (+) Type 2 diabetes mellitus, without long-term current use of insulin (HCC)      /RENAL   (+) Acute renal failure superimposed on stage 4 chronic kidney disease (HCC)      HEMATOLOGY   (+) Acute on chronic anemia      PULMONARY   (+) COPD (chronic obstructive pulmonary disease) (HCC)   (+) Current smoker        Physical Exam    Airway      TM Distance: >3 FB  Neck ROM: full     Dental       Cardiovascular  Cardiovascular exam normal    Pulmonary  Pulmonary exam normal     Other Findings        Anesthesia Plan  ASA Score- 3     Anesthesia Type- IV sedation with anesthesia with ASA Monitors  Additional Monitors:   Airway Plan:           Plan Factors-    Chart reviewed  Induction- intravenous  Postoperative Plan-     Informed Consent- Anesthetic plan and risks discussed with patient  I personally reviewed this patient with the CRNA  Discussed and agreed on the Anesthesia Plan with the CRNA  Rolando Duran

## 2021-09-27 NOTE — ASSESSMENT & PLAN NOTE
Malnutrition Findings:   Adult Malnutrition type: Acute illness (related to odynophagia as evidenced by 8 8% weight loss x 3 mo (6/29/21 148lb, 9/25/21 135lb), severe muscle loss to interroseous, quadriceps and gastrocnemius muscles, moderate muscle loss to pectoralis, deltoid   )  Adult Degree of Malnutrition: Other severe protein calorie malnutrition ( and temporalis muscles, moderate fat loss to orbitals and triceps  Treated with: Dysphagia diet per ST + no further diet restrictions once medically appropriate for diet  Will add supplements as diet advances  Recommend daily weights )    BMI Findings: Body mass index is 19 94 kg/m²       · As evidenced by insufficient protein calorie intake and muscle wasting  · Nutrition consult

## 2021-09-27 NOTE — PLAN OF CARE
Bedside swallow evaluation orders received and appreciated  Pt currently NPO and in the PACU, unable to initiate evaluation at this time  Will continue to follow and complete evaluation as able, as appropriate      Vanessa Milan, SLP

## 2021-09-27 NOTE — ASSESSMENT & PLAN NOTE
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

## 2021-09-27 NOTE — ASSESSMENT & PLAN NOTE
·  baseline appears to be around 13  Patient did not report any melena prior to admission however he is a poor historian  Noted to have melanotic stools since admission  · Iron panel shows iron deficiency anemia  · Hemoglobin 10 on presentation now dropped to 8 9  · Stool Hemoccult positive for melanotic stools  · Only transfuse if hemoglobin drops less than 7 5    Placed on Protonix 40 mg IV b i d   · No active bleeding noted on limited EGD however needs repeat EGD to evaluate further

## 2021-09-27 NOTE — CONSULTS
Consult received for malnutrition  Pt reports painful swallowing with both liquids and beverages  Says hard/tough foods like steak and hot beverages like hot coffee are most difficult  Pt reports poor intake recently due to painful swallowing  Says was able to consume soft foods such as spaghetti, creamed chip beef, low intake of PRO rich foods  Noting recent 8 8% wt loss x 3 mo per chart review  Pt reports UBW of 154lb  3/23/21 153lb, 6/29/21 148lb, 9/25/21 135lb  Physical exam noting moderate fat loss to orbitals and triceps  Severe muscle loss to quadriceps, gastrocnemius, interroseous muscles and moderate muscle loss to temporalis, pectoralis, deltoid muscles  Meets criteria for severe acute malnutrition  Advance diet as medically appropriate to dysphagia diet per ST, no further diet restrictions at this time  Will add supplements as diet advances  Recommend daily weights to monitor nutritional status  Thank you for the consult, will follow up

## 2021-09-27 NOTE — CONSULTS
Consultation - [facility]  Maddy Francisred 80 y o  male MRN: [id number]  Unit/Bed#: PACU 04 Encounter: [id number]        Inpatient consult to gastroenterology  Consult performed by: MICAH Mayberry  Consult ordered by: Dilan Graves MD          Reason for Consult / Principal Problem:   Pain and difficulty with swallowing      ASSESSMENT AND PLAN:    Mr Radha Ramirez is an 80year old male with 2-3 weeks of pain and difficulty with swallowing, melena, and unintentional reported weight loss of 20lbs in 1 month  Odynophagia  reflux vs stricture vs mass vs esophagitis   Strep a negative  Keep NPO for EGD today   Wife requesting a call after scope today  If any intervention needed, will need to wait until Friday to complete given that his last dose of Kathey Copa was 09/26/2021 at 2120  CT scan without obvious mass  Unable to perform bedside speech evaluation today as he is npo  Continue IV fluids for hydration  Cont PPI IV BID    Normocytic iron deficiency anemia with melena  Hemoglobin 8 9 today, stable from yesterday  Hemoglobin 07/12/2021 was 12 1  Iron sat low at 16%, TIBC low at 176, iron low at 28 and ferritin normal at 103  TSAT 15 with previous iron panel 07/12/2021 was 56, transferrin 232, iron sat 17%, TIBC 324  On iron as outpatient     Ischemic cardiomyopathy with LVEF 35%  Hold asa and Brillinta for possible EGD Thursday or Friday   Brilinta to be held for 3 days prior to procedure if any interventions are needed  May continue asa      Patient seen with and plan of care formulated with Dr Maureen Chavez[address]  Gastroenterology  ______________________________________________________________________    HPI:   Mr Radha Ramirez is an 80year old male here for 2-3 weeks of painful and difficult swallowing with associated unintentional weight loss  Now with maroon stools  Started suddenly 2 weeks ago with intermittent pain   Says it started after having ICD placed by Dr Anastacia Eid of [facility] cardiology 4 weeks ago  Can not swallow his own spit, often will spit it up  Swallows liquids without issues, but mentions if its cold it burns  Soft foods he is able to eat without getting stuck, but any hard foods feel like they get stuck  Feels stuck in his throat and chest     Regurgitates it back up, while eating  Does not have this feeling every time he eats  Has epigastric discomfort intermittently, can't associate it with eating  Takes tums sometimes with minimal help  Denies any EGD    Never had colonoscopy  Denies nausea and vomiting  Sometimes sees red blood stool  Never had coffee ground emesis or blood in vomit  Lower abdominal pain on occasion with eating  Pain goes away on its own  Moves his bowels normally 2 times per day, some are hard and some are loose  Red blood in the toilet bowl  Once in a while when he wipes its on tissue  Normally dark brown in color, sometimes black  Says he first noticed after ICD was placed  Has recently finished prednisone for upper respiratory reasons, he is unsure of date and reason  Home medications-daily 81mg aspirin and brillinta (since 2014) per cardiology, with last dose 09/26/2021 for both  No PPI at home  OTC-tylenol rarely for pain, no NSAIDS besides daily baby apirin    Supplements-denies any    Tobacco use-1/2 ppd since age 12, used to smoke 1ppd     ETOH-1 beer at [employer], no history of heavy alcohol use    Drug use-denies    Family history significant for his mom with cancer but unsure of kind, otherwise no colon cancer or liver disease that he is aware of  Lives with son, wife, and grandchildren  Noticed weight loss for the past month  Weight 1 month ago was 154  Feels hungry but when he sits down he can't eat  No previous abdominal surgeries  Chest pain at site of ICD placement      REVIEW OF SYSTEMS:    Review of Systems   Constitutional: Positive for unexpected weight change  Negative for appetite change, chills and fever  HENT: Positive for trouble swallowing  Respiratory: Negative for cough and shortness of breath  Cardiovascular: Positive for chest pain  Gastrointestinal: Positive for abdominal pain, anal bleeding and blood in stool  Negative for abdominal distention, constipation, diarrhea, nausea, rectal pain and vomiting  Genitourinary: Negative for difficulty urinating  Skin: Positive for pallor  Negative for color change and rash  Psychiatric/Behavioral: Negative  All other systems reviewed and are negative  Historical Information   Past Medical History:   Diagnosis Date    [facility]     COPD [facility]chronic obstructive pulmonary disease) [facility]     Coronary artery disease     Diabetes mellitus ([address] )     Gout     High cholesterol     Hypertension     Renal disorder      Past Surgical History:   Procedure Laterality Date    CARDIAC DEFIBRILLATOR PLACEMENT      CARDIAC SURGERY      CATARACT EXTRACTION      FEMORAL ARTERY STENT       Social History   Social History     Substance and Sexual Activity   Alcohol Use Never     Social History     Substance and Sexual Activity   Drug Use Never     Social History     Tobacco Use   Smoking Status Current Every Day Smoker   Smokeless Tobacco Never Used     History reviewed  No pertinent family history      Meds/Allergies     Medications Prior to Admission   Medication    albuterol (PROVENTIL HFA,VENTOLIN HFA) 90 mcg/act inhaler    allopurinol (ZYLOPRIM) 100 mg tablet    aspirin (ECOTRIN LOW STRENGTH) 81 mg EC tablet    azithromycin (ZITHROMAX) 250 mg tablet    calcitriol (ROCALTROL) 0 25 mcg capsule    carvedilol (COREG) 25 mg tablet    ferrous sulfate 325 (65 Fe) mg tablet    furosemide (LASIX) 20 mg tablet    glimepiride (AMARYL) 2 mg tablet    isosorbide mononitrate (IMDUR) 120 mg 24 hr tablet    linaGLIPtin (Tradjenta) 5 MG TABS    ramipril (ALTACE) 10 MG capsule    simvastatin (ZOCOR) 20 mg tablet    umeclidinium bromide (Incruse Ellipta) 62 5 mcg/inh AEPB inhaler    acetaminophen (TYLENOL) 500 mg tablet    albuterol (2 5 mg/3 mL) 0 083 % nebulizer solution    ergocalciferol (ERGOCALCIFEROL) 1 25 MG (51253 UT) capsule    mirtazapine (REMERON) 15 mg tablet    nitroglycerin (NITROSTAT) 0 4 mg SL tablet    predniSONE 20 mg tablet    spironolactone (ALDACTONE) 25 mg tablet    ticagrelor 60 MG     Current Facility-Administered Medications   Medication Dose Route Frequency    acetaminophen (TYLENOL) tablet 650 mg  650 mg Oral Q6H PRN    albuterol (PROVENTIL HFA,VENTOLIN HFA) inhaler 2 puff  2 puff Inhalation Q6H PRN    albuterol inhalation solution 2 5 mg  2 5 mg Nebulization Q6H PRN    allopurinol (ZYLOPRIM) tablet 100 mg  100 mg Oral Daily    carvedilol (COREG) tablet 6 25 mg  6 25 mg Oral BID With Meals    dextrose 5 % and sodium chloride 0 9 % infusion  50 mL/hr Intravenous Continuous    ipratropium (ATROVENT HFA) inhaler 1 puff  1 puff Inhalation Q12H [facility]    isosorbide mononitrate (IMDUR) 24 hr tablet 60 mg  60 mg Oral Daily    mirtazapine (REMERON) tablet 7 5 mg  7 5 mg Oral BID    pantoprazole (PROTONIX) injection 40 mg  40 mg Intravenous Q12H GRACE       No Known Allergies        Objective     Blood pressure 142/51, pulse 67, temperature 98 6 °F (37 °C), temperature source Oral, resp  rate 18, height 5' 9" (1 753 m), weight 61 3 kg (135 lb 2 3 oz), SpO2 98 %  Body mass index is 19 96 kg/m²  Intake/Output Summary (Last 24 hours) at 9/27/2021 0941  Last data filed at 9/26/2021 1409  Gross per 24 hour   Intake 240 ml   Output --   Net 240 ml         PHYSICAL EXAM:      Physical Exam  Vitals and nursing note reviewed  Constitutional:       General: He is not in acute distress  Appearance: He is ill-appearing  HENT:      Head: Normocephalic and atraumatic  Mouth/Throat:      Mouth: Mucous membranes are dry     Eyes: Conjunctiva/sclera: Conjunctivae normal       Pupils: Pupils are equal, round, and reactive to light  Cardiovascular:      Rate and Rhythm: Normal rate  Pulmonary:      Effort: Pulmonary effort is normal    Abdominal:      General: Abdomen is flat  There is no distension  Palpations: Abdomen is soft  There is no mass  Tenderness: There is abdominal tenderness  There is no guarding or rebound  Hernia: No hernia is present  Comments: Epigastric pain with deep palpation  Dark black stool on rectal exam today  External hemorrhoids-not actively bleeding    Skin:     General: Skin is warm and dry  Coloration: Skin is pale  Skin is not jaundiced  Findings: No rash  Neurological:      General: No focal deficit present  Mental Status: He is alert and oriented to person, place, and time     Psychiatric:         Mood and Affect: Mood normal              Lab Results:   Admission on 09/25/2021   Component Date Value    WBC 09/25/2021 14 01*    RBC 09/25/2021 3 20*    Hemoglobin 09/25/2021 10 0*    Hematocrit 09/25/2021 30 7*    MCV 09/25/2021 96     MCH 09/25/2021 31 3     MCHC 09/25/2021 32 6     RDW 09/25/2021 15 1     MPV 09/25/2021 10 3     Platelets 67/74/8265 276     nRBC 09/25/2021 0     Neutrophils Relative 09/25/2021 84*    Immat GRANS % 09/25/2021 1     Lymphocytes Relative 09/25/2021 7*    Monocytes Relative 09/25/2021 6     Eosinophils Relative 09/25/2021 1     Basophils Relative 09/25/2021 1     Neutrophils Absolute 09/25/2021 11 89*    Immature Grans Absolute 09/25/2021 0 11     Lymphocytes Absolute 09/25/2021 1 00     Monocytes Absolute 09/25/2021 0 80     Eosinophils Absolute 09/25/2021 0 14     Basophils Absolute 09/25/2021 0 07     Sodium 09/25/2021 138     Potassium 09/25/2021 4 8     Chloride 09/25/2021 104     CO2 09/25/2021 22     ANION GAP 09/25/2021 12     BUN 09/25/2021 78*    Creatinine 09/25/2021 4 41*    Glucose 09/25/2021 82     Calcium 09/25/2021 8 1*    Corrected Calcium 09/25/2021 9 0     AST 09/25/2021 16     ALT 09/25/2021 24     Alkaline Phosphatase 09/25/2021 96     Total Protein 09/25/2021 6 2*    Albumin 09/25/2021 2 9*    Total Bilirubin 09/25/2021 0 33     eGFR 09/25/2021 12     LACTIC ACID 09/25/2021 0 9     Troponin I 09/25/2021 <0 02     STREP A PCR 09/25/2021 None Detected     Ventricular Rate 09/25/2021 64     Atrial Rate 09/25/2021 64     NM Interval 09/25/2021 204     QRSD Interval 09/25/2021 160     QT Interval 09/25/2021 458     QTC Interval 09/25/2021 472     P Axis 09/25/2021 66     QRS Axis 09/25/2021 2     T Wave Axis 09/25/2021 180     Clarity, UA 09/26/2021 Clear     Color, UA 09/26/2021 Yellow     Specific Gravity, UA 09/26/2021 1 020     pH, UA 09/26/2021 5 0     Glucose, UA 09/26/2021 Negative     Ketones, UA 09/26/2021 Negative     Blood, UA 09/26/2021 Negative     Protein, UA 09/26/2021 Negative     Nitrite, UA 09/26/2021 Negative     Bilirubin, UA 09/26/2021 Negative     Urobilinogen, UA 09/26/2021 0 2     Leukocytes, UA 09/26/2021 Negative     WBC, UA 09/26/2021 0-1*    RBC, UA 09/26/2021 1-2*    Hyaline Casts, UA 09/26/2021 2-4*    Bacteria, UA 09/26/2021 Occasional     Epithelial Cells 09/26/2021 Occasional     Hemoglobin A1C 09/26/2021 6 1*    EAG 09/26/2021 128     Sodium 09/26/2021 142     Potassium 09/26/2021 4 4     Chloride 09/26/2021 109*    CO2 09/26/2021 21     ANION GAP 09/26/2021 12     BUN 09/26/2021 66*    Creatinine 09/26/2021 3 46*    Glucose 09/26/2021 81     Calcium 09/26/2021 7 4*    Corrected Calcium 09/26/2021 8 8     AST 09/26/2021 14     ALT 09/26/2021 18     Alkaline Phosphatase 09/26/2021 77     Total Protein 09/26/2021 5 1*    Albumin 09/26/2021 2 3*    Total Bilirubin 09/26/2021 0 21     eGFR 09/26/2021 16     WBC 09/26/2021 9 26     RBC 09/26/2021 2 70*    Hemoglobin 09/26/2021 8 5*    Hematocrit 09/26/2021 26 0*    MCV 09/26/2021 96     MCH 09/26/2021 31 5     MCHC 09/26/2021 32 7     RDW 09/26/2021 15 3*    MPV 09/26/2021 10 7     Platelets 43/15/4833 216     nRBC 09/26/2021 0     Neutrophils Relative 09/26/2021 85*    Immat GRANS % 09/26/2021 1     Lymphocytes Relative 09/26/2021 8*    Monocytes Relative 09/26/2021 5     Eosinophils Relative 09/26/2021 1     Basophils Relative 09/26/2021 0     Neutrophils Absolute 09/26/2021 7 77*    Immature Grans Absolute 09/26/2021 0 08     Lymphocytes Absolute 09/26/2021 0 77     Monocytes Absolute 09/26/2021 0 50     Eosinophils Absolute 09/26/2021 0 10     Basophils Absolute 09/26/2021 0 04     POC Glucose 09/26/2021 58*    POC Glucose 09/26/2021 145*    Sodium, Ur 09/26/2021 51     Creatinine, Ur 09/26/2021 92 5     POC Glucose 09/26/2021 104     Vitamin B-12 09/26/2021 615     Fecal Occult Blood Diagn* 09/26/2021 Positive*    Fecal Occult Blood Diagn* 09/26/2021 Positive*    Iron Saturation 09/26/2021 16*    TIBC 09/26/2021 176*    Iron 09/26/2021 28*    Ferritin 09/26/2021 103     POC Glucose 09/26/2021 57*    POC Glucose 09/26/2021 62*    POC Glucose 09/26/2021 63*    POC Glucose 09/26/2021 162*    Hemoglobin 09/26/2021 8 9*    Hematocrit 09/26/2021 27 8*    POC Glucose 09/27/2021 92     Sodium 09/27/2021 144     Potassium 09/27/2021 4 6     Chloride 09/27/2021 111*    CO2 09/27/2021 21     ANION GAP 09/27/2021 12     BUN 09/27/2021 46*    Creatinine 09/27/2021 2 48*    Glucose 09/27/2021 90     Calcium 09/27/2021 7 9*    Corrected Calcium 09/27/2021 9 1     AST 09/27/2021 11     ALT 09/27/2021 17     Alkaline Phosphatase 09/27/2021 80     Total Protein 09/27/2021 5 2*    Albumin 09/27/2021 2 5*    Total Bilirubin 09/27/2021 0 26     eGFR 09/27/2021 23     Magnesium 09/27/2021 2 4     WBC 09/27/2021 8 04     RBC 09/27/2021 2 84*    Hemoglobin 09/27/2021 8 9*    Hematocrit 09/27/2021 27 4*    MCV 09/27/2021 97     MCH 09/27/2021 31 3     MCHC 09/27/2021 32 5     RDW 09/27/2021 15 3*    Platelets 01/80/3555 219     MPV 09/27/2021 10 2     POC Glucose 09/27/2021 77     POC Glucose 09/27/2021 87        Imaging Studies: I have personally reviewed pertinent imaging studies  CT chest abdomen pelvis without contrast 09/26/2021:  FINDINGS:     CHEST     LUNGS:  Centrilobular emphysema, upper lobe predominant     There is no tracheal or endobronchial lesion      PLEURA:  Unremarkable      HEART/GREAT VESSELS:  Atherosclerotic changes are noted in thoracic aorta and coronary arteries      MEDIASTINUM AND DAKOTA:  Unremarkable      CHEST WALL AND LOWER NECK:   Unremarkable      ABDOMEN     LIVER/BILIARY TREE:  Unremarkable      GALLBLADDER:  There are gallstone(s) within the gallbladder, without pericholecystic inflammatory changes      SPLEEN:  Unremarkable      PANCREAS:  Unremarkable      ADRENAL GLANDS:  There is low density lobulated thickening of adrenal glands bilaterally statistically most likely to represent adenomatous hyperplasia      KIDNEYS/URETERS:  Unremarkable  No hydronephrosis      STOMACH AND BOWEL:  There is colonic diverticulosis without evidence of acute diverticulitis      APPENDIX:  No findings to suggest appendicitis      ABDOMINOPELVIC CAVITY:  No ascites  No pneumoperitoneum    No lymphadenopathy      VESSELS:  Unremarkable for patient's age      PELVIS     REPRODUCTIVE ORGANS:  The prostate is enlarged      URINARY BLADDER:  Unremarkable      ABDOMINAL WALL/INGUINAL REGIONS:  Unremarkable      OSSEOUS STRUCTURES:  No acute fracture or destructive osseous lesion      IMPRESSION:     No evidence of acute pathology throughout the chest, abdomen or pelvis     CT soft tissue neck without contrast 09/25/2021:  FINDINGS:     VISUALIZED BRAIN PARENCHYMA:  Normal visualized brain parenchyma      VISUALIZED ORBITS AND PARANASAL SINUSES:  Left maxillary sinus because of thickening with small air-fluid level in the right maxillary sinus     NASAL CAVITY AND NASOPHARYNX:  Normal      SUPRAHYOID NECK:  Normal oropharynx and oral cavity  Normal parapharyngeal and retropharyngeal spaces  Normal tonsillar tissue and epiglottis  Normal infratemporal space      INFRAHYOID NECK:  Aryepiglottic folds and piriform sinuses are normal  Normal larynx and subglottic airway      THYROID GLAND:  Unremarkable      PAROTID AND SUBMANDIBULAR GLANDS: Normal      LYMPH NODES:  No pathologic or enlarged adenopathy      VASCULAR STRUCTURES:  Limited without contrast      THORACIC INLET:  Emphysematous changes     BONY STRUCTURES:  Normal      Atrophy of the right  muscles      IMPRESSION:     No acute CT findings   Emphysematous lung changes

## 2021-09-27 NOTE — PLAN OF CARE
Problem: Potential for Falls  Goal: Patient will remain free of falls  Description: INTERVENTIONS:  - Educate patient/family on patient safety including physical limitations  - Instruct patient to call for assistance with activity   - Consult OT/PT to assist with strengthening/mobility   - Keep Call bell within reach  - Keep bed low and locked with side rails adjusted as appropriate  - Keep care items and personal belongings within reach  - Initiate and maintain comfort rounds  - Make Fall Risk Sign visible to staff  - Offer Toileting every 2 Hours, in advance of need  - Obtain necessary fall risk management equipment: nonskid footwear  - Apply yellow socks and bracelet for high fall risk patients  - Consider moving patient to room near nurses station  Outcome: Progressing     Problem: Nutrition/Hydration-ADULT  Goal: Nutrient/Hydration intake appropriate for improving, restoring or maintaining nutritional needs  Description: Monitor and assess patient's nutrition/hydration status for malnutrition  Collaborate with interdisciplinary team and initiate plan and interventions as ordered  Monitor patient's weight and dietary intake as ordered or per policy  Utilize nutrition screening tool and intervene as necessary  Determine patient's food preferences and provide high-protein, high-caloric foods as appropriate       INTERVENTIONS:  - Monitor oral intake, urinary output, labs, and treatment plans  - Assess nutrition and hydration status and recommend course of action  - Evaluate amount of meals eaten  - Assist patient with eating if necessary   - Allow adequate time for meals  - Recommend/ encourage appropriate diets, oral nutritional supplements, and vitamin/mineral supplements  - Order, calculate, and assess calorie counts as needed  - Recommend, monitor, and adjust tube feedings and TPN/PPN based on assessed needs  - Assess need for intravenous fluids  - Provide specific nutrition/hydration education as appropriate  - Include patient/family/caregiver in decisions related to nutrition  Outcome: Progressing     Problem: MOBILITY - ADULT  Goal: Maintain or return to baseline ADL function  Description: INTERVENTIONS:  -  Assess patient's ability to carry out ADLs; assess patient's baseline for ADL function and identify physical deficits which impact ability to perform ADLs (bathing, care of mouth/teeth, toileting, grooming, dressing, etc )  - Assess/evaluate cause of self-care deficits   - Assess range of motion  - Assess patient's mobility; develop plan if impaired  - Assess patient's need for assistive devices and provide as appropriate  - Encourage maximum independence but intervene and supervise when necessary  - Involve family in performance of ADLs  - Assess for home care needs following discharge   - Consider OT consult to assist with ADL evaluation and planning for discharge  - Provide patient education as appropriate  Outcome: Progressing  Goal: Maintains/Returns to pre admission functional level  Description: INTERVENTIONS:  - Perform BMAT or MOVE assessment daily    - Set and communicate daily mobility goal to care team and patient/family/caregiver  - Collaborate with rehabilitation services on mobility goals if consulted  - Perform Range of Motion 3 times a day  - Reposition patient every 2 hours    - Dangle patient 3 times a day  - Stand patient 3 times a day  - Ambulate patient 3 times a day  - Out of bed to chair 3 times a day   - Out of bed for meals 3 times a day  - Out of bed for toileting  - Record patient progress and toleration of activity level   Outcome: Progressing

## 2021-09-27 NOTE — ASSESSMENT & PLAN NOTE
Lab Results   Component Value Date    HGBA1C 6 1 (H) 09/26/2021       Recent Labs     09/27/21  0015 09/27/21  0533 09/27/21  0703 09/27/21  1115   POCGLU 92 77 87 92       Blood Sugar Average: Last 72 hrs:  (P) 90 [id number]   · NPO  · Fingerstick blood sugar every 6 hours to prevent hypoglycemia   patient was having hypoglycemia on presentation  · hemoglobin A1c 6 1   · Continue gentle hydration

## 2021-09-27 NOTE — RESPIRATORY THERAPY NOTE
RT Protocol Note  Kayden Akins 80 y o  male MRN: [id number]  Unit/Bed#: OVR 04 Encounter: [id number]    Assessment    Principal Problem:    Acute renal failure superimposed on stage 4 chronic kidney disease ([place] [address] )  Active Problems:    Odynophagia    Hypotension    Acute on chronic anemia    COPD (chronic obstructive pulmonary disease) ([facility])    Ischemic cardiomyopathy    Severe protein-calorie malnutrition ([facility])    Type 2 diabetes mellitus, without long-term current use of insulin ([facility])    Current smoker    Heme positive stool      Home Pulmonary Medications:  Incruise elipta, albuterol       Past Medical History:   Diagnosis Date    CHF (congestive heart failure) ([facility])     COPD (chronic obstructive pulmonary disease) ([place] [address] )     Coronary artery disease     Diabetes mellitus ([place] [address] )     Gout     High cholesterol     Hypertension     Renal disorder      Social History     Socioeconomic History    Marital status: /Civil Union     Spouse name: None    Number of children: None    Years of education: None    Highest education level: None   Occupational History    None   Tobacco Use    Smoking status: Current Every Day Smoker    Smokeless tobacco: Never Used   Substance and Sexual Activity    Alcohol use: Never    Drug use: Never    Sexual activity: None   Other Topics Concern    None   Social History Narrative    None     Social Determinants of Health     Financial Resource Strain:     Difficulty of Paying Living Expenses:    Food Insecurity:     Worried About Running Out of Food in the Last Year:     Ran Out of Food in the Last Year:    Transportation Needs:     Lack of Transportation (Medical):      Lack of Transportation (Non-Medical):    Physical Activity:     Days of Exercise per Week:     Minutes of Exercise per Session:    Stress:     Feeling of Stress :    Social Connections:     Frequency of Communication with Friends and Family:     Frequency of Social Gatherings with Friends and Family:     Attends Gnosticism Services:     Active Member of Clubs or Organizations:     Attends Club or Organization Meetings:     Marital Status:    Intimate Partner Violence:     Fear of Current or Ex-Partner:     Emotionally Abused:     Physically Abused:     Sexually Abused:        Subjective         Objective    Physical Exam:   Assessment Type: Assess only  General Appearance: Alert, Awake  Respiratory Pattern: Dyspnea with exertion  Chest Assessment: Chest expansion symmetrical  Bilateral Breath Sounds: Diminished  Cough: None  O2 Device: room air    Vitals:  Blood pressure 123/56, pulse 75, temperature 98 6 °F (37 °C), resp  rate 18, height 5' 9" (1 753 m), weight 61 3 kg (135 lb 2 3 oz), SpO2 98 %  Imaging and other studies: The lungs are clear  No pneumothorax or pleural effusion    O2 Device: room air     Plan    Respiratory Plan: Home Bronchodilator Patient pathway        Resp Comments: Patient is current 1/2 pack per day smoker  he reports smoking for the past 65 years  He uses incruise Elipta daily and has an albuterol MDI to manage occasional short ness of breatth    he denies currently feeling short of breath, "as long as I'm not doing something "

## 2021-09-27 NOTE — PROGRESS NOTES
Progress Note - Nephrology   Guzman Clemencia 80 y o  male MRN: [id number]  Unit/Bed#: PACU 04 Encounter: [id number]    A/P:  1  Acute kidney injury on top of chronic kidney disease    Most likely due to volume depletion, patient's creatinine is essentially back to his baseline creatinine  Will reduce IV fluids at this time and look to discontinue them altogether as long as eating and drinking appropriately there are no other confounding clinical issues which will require that they be continued  Continue avoid potential nephrotoxins  2  Chronic kidney disease stage 4 with baseline creatinine between 2 2-2 6 mg/ dL   3  Volume depletion    Patient is typically on spironolactone and Lasix, both currently on hold  He is approaching euvolemia at this time and I will be reducing his IV fluids further to 25 mL/ hour for now and as mentioned above look to discontinue IV fluids altogether as long as eating and drinking appropriately  4  Borderline hypernatremia    Encourage oral intake of free water according to thirst, continue monitor for now  5  Ischemic cardiomyopathy with chronic systolic congestive heart failure    Will look to reinitiate diuretics in the near future  We can consider restarting spironolactone as early as today for clinically indicated, a potentially look to re-initiation tomorrow, September 28th along with any other medications that may be indicated at that time        Follow up reason for today's visit:   Acute kidney injury/chronic kidney disease/volume depletion    Acute renal failure superimposed on stage 4 chronic kidney disease ([facility])    Patient Active Problem List   Diagnosis    Odynophagia    Hypotension    Acute renal failure superimposed on stage 4 chronic kidney disease ([facility])    Acute on chronic anemia    COPD (chronic obstructive pulmonary disease) ([facility])    Ischemic cardiomyopathy    Severe protein-calorie malnutrition ([facility])    Type 2 diabetes mellitus, without long-term current use of insulin ([facility])    Current smoker    Heme positive stool         Subjective:    no acute events overnight, denies fevers or chills  No abdominal pain at this time  Objective:     Vitals: Blood pressure 142/51, pulse 67, temperature 98 6 °F (37 °C), temperature source Oral, resp  rate 18, height 5' 9" (1 753 m), weight 61 3 kg (135 lb 2 3 oz), SpO2 98 %  ,Body mass index is 19 96 kg/m²  Weight (last 2 days)     Date/Time   Weight    09/25/21 1621   61 3 (135 14)                Intake/Output Summary (Last 24 hours) at 9/27/2021 1011  Last data filed at 9/26/2021 1409  Gross per 24 hour   Intake 240 ml   Output --   Net 240 ml     I/O last 3 completed shifts: In: 740 [P O :240; IV Piggyback:500]  Out: -          Physical Exam: /51 (BP Location: Left arm)   Pulse 67   Temp 98 6 °F (37 °C) (Oral)   Resp 18   Ht 5' 9" (1 753 m)   Wt 61 3 kg (135 lb 2 3 oz)   SpO2 98%   BMI 19 96 kg/m²     General Appearance:    Alert, cooperative, no distress, appears stated age   Head:    Normocephalic, without obvious abnormality, atraumatic   Eyes:    Conjunctiva/corneas clear   Ears:    Normal external ears   Nose:   Nares normal, septum midline, mucosa normal, no drainage    or sinus tenderness   Throat:   Lips, mucosa, and tongue normal; teeth and gums normal   Neck:   Supple   Back:     Symmetric, no curvature, ROM normal, no CVA tenderness   Lungs:     Clear to auscultation bilaterally, respirations unlabored   Chest wall:    No tenderness or deformity   Heart:    Regular rate and rhythm, S1 and S2 normal, no murmur, rub   or gallop   Abdomen:     Soft, non-tender, bowel sounds active   Extremities:   Extremities normal, atraumatic, no cyanosis or edema   Skin:   Skin color, texture, turgor normal, no rashes or lesions   Lymph nodes:   Cervical normal   Neurologic:   CNII-XII intact            Lab, Imaging and other studies: I have personally reviewed pertinent labs    CBC:   Lab Results Component Value Date    WBC 8 04 09/27/2021    HGB 8 9 (L) 09/27/2021    HCT 27 4 (L) 09/27/2021    MCV 97 09/27/2021     09/27/2021    MCH 31 3 09/27/2021    MCHC 32 5 09/27/2021    RDW 15 3 (H) 09/27/2021    MPV 10 2 09/27/2021     CMP:   Lab Results   Component Value Date    K 4 6 09/27/2021     (H) 09/27/2021    CO2 21 09/27/2021    BUN 46 (H) 09/27/2021    CREATININE 2 48 (H) 09/27/2021    CALCIUM 7 9 (L) 09/27/2021    AST 11 09/27/2021    ALT 17 09/27/2021    ALKPHOS 80 09/27/2021    EGFR 23 09/27/2021         Results from last 7 days   Lab Units 09/27/21  0822 09/26/21  0539 09/25/21  1655   POTASSIUM mmol/L 4 6 4 4 4 8   CHLORIDE mmol/L 111* 109* 104   CO2 mmol/L 21 21 22   BUN mg/dL 46* 66* 78*   CREATININE mg/dL 2 48* 3 46* 4 41*   CALCIUM mg/dL 7 9* 7 4* 8 1*   ALK PHOS U/L 80 77 96   ALT U/L 17 18 24   AST U/L 11 14 16         Phosphorus: No results found for: PHOS  Magnesium:   Lab Results   Component Value Date    MG 2 4 09/27/2021     Urinalysis: No results found for: COLORU, CLARITYU, SPECGRAV, PHUR, LEUKOCYTESUR, NITRITE, PROTEINUA, GLUCOSEU, KETONESU, BILIRUBINUR, BLOODU  Ionized Calcium: No results found for: CAION  Coagulation: No results found for: PT, INR, APTT  Troponin: No results found for: TROPONINI  ABG: No results found for: PHART, YWW3VAN, PO2ART, XIL3WMM, R2CQGTOK, BEART, SOURCE  Radiology review:     IMAGING  Procedure: CT chest abdomen pelvis wo contrast    Result Date: 9/27/2021  Narrative: CT CHEST, ABDOMEN AND PELVIS WITHOUT IV CONTRAST INDICATION:   Hypotension Acute kidney injury and acute dysphagia  COMPARISON:  None  TECHNIQUE: CT examination of the chest, abdomen and pelvis was performed without intravenous contrast   Axial, sagittal, and coronal 2D reformatted images were created from the source data and submitted for interpretation  Radiation dose length product (DLP) for this visit:  0699 646 28 85 mGy-cm     This examination, like all CT scans performed in the [facility]  [address], was performed utilizing techniques to minimize radiation dose exposure, including the use of iterative reconstruction and automated exposure control  Enteric contrast was administered  FINDINGS: CHEST LUNGS:  Centrilobular emphysema, upper lobe predominant     There is no tracheal or endobronchial lesion  PLEURA:  Unremarkable  HEART/GREAT VESSELS:  Atherosclerotic changes are noted in thoracic aorta and coronary arteries  MEDIASTINUM AND DAKOTA:  Unremarkable  CHEST WALL AND LOWER NECK:   Unremarkable  ABDOMEN LIVER/BILIARY TREE:  Unremarkable  GALLBLADDER:  There are gallstone(s) within the gallbladder, without pericholecystic inflammatory changes  SPLEEN:  Unremarkable  PANCREAS:  Unremarkable  ADRENAL GLANDS:  There is low density lobulated thickening of adrenal glands bilaterally statistically most likely to represent adenomatous hyperplasia  KIDNEYS/URETERS:  Unremarkable  No hydronephrosis  STOMACH AND BOWEL:  There is colonic diverticulosis without evidence of acute diverticulitis  APPENDIX:  No findings to suggest appendicitis  ABDOMINOPELVIC CAVITY:  No ascites  No pneumoperitoneum  No lymphadenopathy  VESSELS:  Unremarkable for patient's age  PELVIS REPRODUCTIVE ORGANS:  The prostate is enlarged  URINARY BLADDER:  Unremarkable  ABDOMINAL WALL/INGUINAL REGIONS:  Unremarkable  OSSEOUS STRUCTURES:  No acute fracture or destructive osseous lesion  Impression: No evidence of acute pathology throughout the chest, abdomen or pelvis Workstation performed: [id number]     Procedure: XR chest 1 view portable    Result Date: 9/26/2021  Narrative: CHEST INDICATION:   cough  COMPARISON:  None EXAM PERFORMED/VIEWS:  XR CHEST PORTABLE FINDINGS:  Single lead left chest wall pacemaker/ICD with intact lead, tip in the right ventricle  Cardiomediastinal silhouette appears unremarkable  The lungs are clear  No pneumothorax or pleural effusion   Osseous structures appear within normal limits for patient age  Impression: No acute cardiopulmonary disease  Workstation performed: [id number]     Procedure: CT soft tissue neck wo contrast    Result Date: 9/25/2021  Narrative: CT SOFT TISSUE NECK WITHOUT CONTRAST INDICATION:   Difficulty swallowing due to pain  COMPARISON:  None  TECHNIQUE:  Axial, sagittal, and coronal 2D reformatted images were created from the axial source data and submitted for interpretation  Radiation dose length product (DLP) for this visit:  488 mGy-cm   This examination, like all CT scans performed in the [facility], was performed utilizing techniques to minimize radiation dose exposure, including the use of iterative reconstruction and automated exposure control  IMAGE QUALITY:  Diagnostic  FINDINGS: VISUALIZED BRAIN PARENCHYMA:  Normal visualized brain parenchyma  VISUALIZED ORBITS AND PARANASAL SINUSES:  Left maxillary sinus because of thickening with small air-fluid level in the right maxillary sinus NASAL CAVITY AND NASOPHARYNX:  Normal  SUPRAHYOID NECK:  Normal oropharynx and oral cavity  Normal parapharyngeal and retropharyngeal spaces  Normal tonsillar tissue and epiglottis  Normal infratemporal space  INFRAHYOID NECK:  Aryepiglottic folds and piriform sinuses are normal  Normal larynx and subglottic airway  THYROID GLAND:  Unremarkable  PAROTID AND SUBMANDIBULAR GLANDS: Normal  LYMPH NODES:  No pathologic or enlarged adenopathy  VASCULAR STRUCTURES:  Limited without contrast  THORACIC INLET:  Emphysematous changes BONY STRUCTURES:  Normal  Atrophy of the right  muscles  Impression: No acute CT findings  Emphysematous lung changes  Workstation performed: [id number]     Procedure: US kidney and bladder with pvr    Result Date: 9/26/2021  Narrative: RENAL ULTRASOUND INDICATION:   Renal insufficiency   COMPARISON: None TECHNIQUE:   Ultrasound of the retroperitoneum was performed with a curvilinear transducer utilizing volumetric sweeps and still imaging techniques  FINDINGS: KIDNEYS: Symmetric and normal size  Right kidney:  9 3 x 4 3 x 4 5 cm  Left kidney:  9 0 x 6 1 x 4 4 cm  Right kidney Normal echogenicity and contour  No suspicious masses detected  No hydronephrosis  No shadowing calculi  No perinephric fluid collections  Left kidney Normal echogenicity and contour  No suspicious masses detected  No hydronephrosis  No shadowing calculi  No perinephric fluid collections  URETERS: Nonvisualized  BLADDER: Normally distended  No focal thickening or mass lesions  Prevoid bladder volume of 210 mL is noted  Impression: No evidence of obstruction   Workstation performed: [id number]       Current Facility-Administered Medications   Medication Dose Route Frequency    acetaminophen (TYLENOL) tablet 650 mg  650 mg Oral Q6H PRN    albuterol (PROVENTIL HFA,VENTOLIN HFA) inhaler 2 puff  2 puff Inhalation Q6H PRN    albuterol inhalation solution 2 5 mg  2 5 mg Nebulization Q6H PRN    allopurinol (ZYLOPRIM) tablet 100 mg  100 mg Oral Daily    carvedilol (COREG) tablet 6 25 mg  6 25 mg Oral BID With Meals    dextrose 5 % and sodium chloride 0 9 % infusion  50 mL/hr Intravenous Continuous    ipratropium (ATROVENT HFA) inhaler 1 puff  1 puff Inhalation Q12H [address]    isosorbide mononitrate (IMDUR) 24 hr tablet 60 mg  60 mg Oral Daily    mirtazapine (REMERON) tablet 7 5 mg  7 5 mg Oral BID    pantoprazole (PROTONIX) injection 40 mg  40 mg Intravenous Q12H [address]     Medications Discontinued During This Encounter   Medication Reason    carvedilol (COREG) tablet 25 mg     isosorbide mononitrate (IMDUR) 24 hr tablet 120 mg     heparin (porcine) subcutaneous injection 5,000 Units     insulin lispro (HumaLOG) 100 units/mL subcutaneous injection 1-5 Units     carvedilol (COREG) tablet 6 25 mg     isosorbide mononitrate (IMDUR) 24 hr tablet 60 mg     multi-electrolyte (ISOLYTE-S PH 7 4 equivalent) IV solution     aspirin (ECOTRIN LOW STRENGTH) EC tablet 81 mg     ticagrelor tablet 60 mg        Cortney Gonzalez,       This progress note was produced in part using a dictation device which may document imprecise wording from author's original intent

## 2021-09-27 NOTE — ANESTHESIA POSTPROCEDURE EVALUATION
Post-Op Assessment Note    CV Status:  Stable  Pain Score: 0    Pain management: satisfactory to patient     Mental Status:  Sleepy and arousable   PONV Controlled:  None   Airway Patency:  Patent      Post Op Vitals Reviewed: Yes      Staff: Anesthesiologist, CRNA         No complications documented      BP   94/54   Temp      Pulse  72   Resp   14   SpO2   100

## 2021-09-27 NOTE — EMTALA/ACUTE CARE TRANSFER
[address] [address]  Dept: [phone]      ACUTE CARE TRANSFER CONSENT    NAME Paige Perez [birth year]                              MRN [id number]    I have been informed of my rights regarding examination, treatment, and transfer   by Dr Jamal Che MD    Benefits:   odynophagia with esophageal stricture    Risks:    Needs repeat EGD with slim scope    Transfer Request:  I acknowledge that my medical condition has been evaluated and explained to me by the treating physician or other qualified medical person and/or my attending physician who has recommended and offered to me further medical examination and treatment  I understand the Hospital's obligation with respect to the treatment and stabilization of my medical condition  I nevertheless request to be transferred  I release the Hospital, the doctor, and any other persons caring for me from all responsibility or liability for any injury or ill effects that may result from my transfer and agree to accept all responsibility for the consequences of my choice to transfer, rather than receive stabilizing treatment at the Hospital  I understand that because the transfer is my request, my insurance may not provide reimbursement for the services  The Hospital will assist and direct me and my family in how to make arrangements for transfer, but the hospital is not liable for any fees charged by the transport service  In spite of this understanding, I refuse to consent to further medical examination and treatment which has been offered to me, and request transfer to    I authorize the performance of emergency medical procedures and treatments upon me in both transit and upon arrival at the receiving facility    Additionally, I authorize the release of any and all medical records to the receiving facility and request they be transported with me, if · Appropriate medical records of the examination and treatment of the patient are provided at the time of transfer   [facility], [address] _______  · Transfer will be performed by qualified personnel from    and appropriate transfer equipment as required, including the use of necessary and appropriate life support measures  Provider Certification: I have examined the patient and explained the following risks and benefits of being transferred/refusing transfer to the patient/family:         Based on these reasonable risks and benefits to the patient and/or the unborn child(carol), and based upon the information available at the time of the patients examination, I certify that the medical benefits reasonably to be expected from the provision of appropriate medical treatments at another medical facility outweigh the increasing risks, if any, to the individuals medical condition, and in the case of labor to the unborn child, from effecting the transfer      X____________________________________________ DATE 09/27/21        TIME_______      ORIGINAL - SEND TO MEDICAL RECORDS   COPY - SEND WITH PATIENT DURING TRANSFER

## 2021-09-27 NOTE — DISCHARGE SUMMARY
[address]  Discharge- March Fat 8[birth year]21[birth year], 80 y o  male MRN: [id number]  Unit/Bed#: PACU 04 Encounter: [id number]  Primary Care Provider: Hedy Grijalva MD   Date and time admitted to hospital: 9/25/2021  4:16 PM    * Acute renal failure superimposed on stage 4 chronic kidney disease ([facility] [address] )  Assessment & Plan  · Creatinine 4 41  Was 2 89 in August 2021  Suspect multifactorial due to decreased oral intake and diuretic/ACE-inhibitor use  · Received IV fluid hydration following which creatinine improved to 2 48  · Avoid nephrotoxic agents, hypotension  · Nephrology consult  Will use fluids in case patient states NPO    Odynophagia  Assessment & Plan  · Reports pain with swallowing in the lower neck/upper chest   Currently tolerating liquids with no problems  · Patient has lost around 12-15 lb over the last few weeks due to decreased oral intake  · Strep A: negative  · CT soft tissue neck:  No acute CT findings  · Speech eval and GI eval appreciated  · Ct chest abd pelvis no acute pathology  · egd done today shows 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 (38 cm from the incisors)  Non traversable stricture seen at 38 cm from incisors; intervention not performed due to patient being on DAPT and unable to assess distal to stricture with a slim scope    Recommend transfer for EGD with slim scope   Family would like patient transferred to [address] [place] [address] for transfer    Acute on chronic anemia  Assessment & Plan  ·  baseline appears to be around 13  Patient did not report any melena prior to admission however he is a poor historian  Noted to have melanotic stools since admission  · Iron panel shows iron deficiency anemia  · Hemoglobin 10 on presentation now dropped to 8 9  · Stool Hemoccult positive for melanotic stools  · Only transfuse if hemoglobin drops less than 7 5    Placed on Protonix 40 mg IV b i d   · No active bleeding noted on limited EGD however needs repeat EGD to evaluate further  · Patient is on dual antiplatelet at home including aspirin and Brilinta which will both be held temporarily for now for repeat EGD  · Remain NPO for repeat EGD  Noted to have distal esophagitis  Continue PPI b i d  Hypotension  Assessment & Plan  · Blood pressure is alone arrival   Now blood pressure is normal   · Antihypertensive medication doses decreased      Current smoker  Assessment & Plan  · Declines patch  · Encourage cessation    Type 2 diabetes mellitus, without long-term current use of insulin [facility])  Assessment & Plan  Lab Results   Component Value Date    HGBA1C 6 1 (H) 09/26/2021       Recent Labs     09/27/21  0015 09/27/21  0533 09/27/21  0703 09/27/21  1115   POCGLU 92 77 87 92       Blood Sugar Average: Last 72 hrs:  (P) 90 [id number]   · NPO  · Fingerstick blood sugar every 6 hours to prevent hypoglycemia   patient was having hypoglycemia on presentation  · hemoglobin A1c 6 1   · Continue gentle hydration      Severe protein-calorie malnutrition (HCC)  Assessment & Plan  Malnutrition Findings:   Adult Malnutrition type: Acute illness (related to odynophagia as evidenced by 8 8% weight loss x 3 mo (6/29/21 148lb, 9/25/21 135lb), severe muscle loss to interroseous, quadriceps and gastrocnemius muscles, moderate muscle loss to pectoralis, deltoid   )  Adult Degree of Malnutrition: Other severe protein calorie malnutrition ( and temporalis muscles, moderate fat loss to orbitals and triceps  Treated with: Dysphagia diet per ST + no further diet restrictions once medically appropriate for diet  Will add supplements as diet advances  Recommend daily weights )    BMI Findings: Body mass index is 19 94 kg/m²       · As evidenced by insufficient protein calorie intake and muscle wasting  · Nutrition consult    Ischemic cardiomyopathy  Assessment & Plan  · History of ICD  · EF from last echo in records 35% from 1 year ago  · Continue isosorbide, carvedilol, ticagrelor  Decrease dose of Coreg and isosorbide due to hypotension  · Diuretics and ACE-inhibitor on hold due to TRACEE    COPD (chronic obstructive pulmonary disease) ([facility] [address] )  Assessment & Plan  · Does not appear to be in exacerbation  · Continue home regimen of albuterol, Atrovent    Discharging Physician / Practitioner: Rachael Basilio MD  PCP: Jamey Driscoll MD  Admission Date:   Admission Orders (From admission, onward)     Ordered        09/25/21 1841  Inpatient Admission  Once                   Discharge Date: 09/27/21    Medical Problems     Resolved Problems  Date Reviewed: 9/27/2021    None                Consultations During Hospital Stay:  · GI    Procedures Performed:   · EGD    Significant Findings / Test Results:   · None    Incidental Findings:   · None     Test Results Pending at Discharge (will require follow up):   · none     Outpatient Tests Requested:  · None    Complications:  None    Reason for Admission:  Difficulty swallowing    Hospital Course: Kings Kohler is a 80 y o  male patient who originally presented to the hospital on 9/25/2021 due to odynophagia and weight loss of 12-15 lb over the last few weeks  Patient did not report any GI bleed however noted to have melanotic stools on presentation  Hemoglobin dropped from 10-8  5  Found have iron deficiency anemia  CT neck chest abdomen pelvis did not show any acute abnormalities  Underwent EGD which showed tight stricture at the GE junction and the scope could not be advanced further  Needs repeat EGD with a slim scope  Patient will remain NPO for now  Please note that he is on dual antiplatelets with aspirin and Brilinta which will be temporarily held for now  Transfer to [address] [place] [address] on family request due to proximity to them for further EGD and evaluation  Accepted by dr Dub[facility] note patient had le stent placed a year ago  defibrillator placed April 2021 no recent strokes or coronary stents        Please see above list of diagnoses and related plan for additional information  Condition at Discharge: fair     Discharge Day Visit / Exam:     Subjective:  Patient denies any chest pain however complains of chest pain while swallowing solids  Denies any problem with liquids  Denies any nausea vomiting or diarrhea  Underwent EGD today  Vitals: Blood Pressure: 131/62 (09/27/21 1525)  Pulse: 76 (09/27/21 1525)  Temperature: 98 2 °F (36 8 °C) (09/27/21 1525)  Temp Source: Oral (09/27/21 1422)  Respirations: 18 (09/27/21 1525)  Height: 5' 9" (175 3 cm) (09/27/21 1422)  Weight - Scale: 61 2 kg (135 lb) (09/27/21 1422)  SpO2: 99 % (09/27/21 1525)  Exam:   Physical Exam  Vitals and nursing note reviewed  Constitutional:       Comments: Severe generalized muscle wasting   HENT:      Head: Normocephalic and atraumatic  Right Ear: External ear normal       Left Ear: External ear normal       Nose: Nose normal       Mouth/Throat:      Pharynx: Oropharynx is clear  Cardiovascular:      Rate and Rhythm: Normal rate and regular rhythm  Heart sounds: Normal heart sounds  Pulmonary:      Effort: Pulmonary effort is normal       Breath sounds: Normal breath sounds  Abdominal:      General: Bowel sounds are normal       Palpations: Abdomen is soft  Tenderness: There is no abdominal tenderness  Musculoskeletal:         General: Normal range of motion  Cervical back: Normal range of motion and neck supple  Skin:     General: Skin is warm and dry  Capillary Refill: Capillary refill takes less than 2 seconds  Neurological:      General: No focal deficit present  Mental Status: He is alert and oriented to person, place, and time     Psychiatric:         Mood and Affect: Mood normal          Discussion with Family:  Discussed with son    Discharge instructions/Information to patient and family:   See after visit summary for information provided to patient and family  Provisions for Follow-Up Care:  See after visit summary for information related to follow-up care and any pertinent home health orders  Disposition:     [address] Transfer [address] [place] [address] to [facility] SNF:   · Not Applicable to this Patient - Not Applicable to this Patient    Planned Readmission: none     Discharge Statement:  I spent 35 minutes discharging the patient  This time was spent on the day of discharge  I had direct contact with the patient on the day of discharge  Greater than 50% of the total time was spent examining patient, answering all patient questions, arranging and discussing plan of care with patient as well as directly providing post-discharge instructions  Additional time then spent on discharge activities  Discharge Medications:  See after visit summary for reconciled discharge medications provided to patient and family        ** Please Note: This note has been constructed using a voice recognition system **

## 2021-09-27 NOTE — ASSESSMENT & PLAN NOTE
· Heme-positive stool per nursing  · Monitor H&H  · Transfuse for less than 7 5  · Obtain EGD tomorrow  · NPO after midnight

## 2021-09-27 NOTE — CASE MANAGEMENT
Pt is being tranfers to [facility] for EGD slim scope  CM completed Medical necessity and provided to RN  CM called [facility] Rep at [phone] to inquire about transportation auth  CM spoke with Brinda  She reported no transport Sulaiman Olivares is required  Call Reference # [id number]

## 2021-09-27 NOTE — TRANSPORTATION MEDICAL NECESSITY
Section I - General Information    Name of Patient: Nenita Davis                 :     Medicare #: [id number]  Transport Date: 21 (PCS is valid for round trips on this date and for all repetitive trips in the 60-day range as noted below )  Origin: [address] [place] [address] West: [address] [place] [address]   Is the pt's stay covered under Medicare Part A (PPS/DRG)   []     Closest appropriate facility? If no, why is transport to more distant facility required? Yes  If hospice pt, is this transport related to pt's terminal illness? No       Section II - Medical Necessity Questionnaire  Ambulance transportation is medically necessary only if other means of transport are contraindicated or would be potentially harmful to the patient  To meet this requirement, the patient must either be "bed confined" or suffer from a condition such that transport by means other than ambulance is contraindicated by the patient's condition  The following questions must be answered by the medical professional signing below for this form to be valid:    1)  Describe the MEDICAL CONDITION (physical and/or mental) of this patient AT [address] [place] [address] that requires the patient to be transported in an ambulance and why transport by other means is contraindicated by the patient's condition: Acute renal failure superimposed on stage 4 chronic kidney disease, Odynophagia, acute on chronic anemia, severe protein calorie malnutrition, ICD in place, COPD, Being transferred to [address] [place] [address] for EGD with slim scope    2) Is the patient "bed confined" as defined below? No  To be "be confined" the patient must satisfy all three of the following conditions: (1) unable to get up from bed without Assistance; AND (2) unable to ambulate; AND (3) unable to sit in a chair or wheelchair      3) Can this patient safely be transported by car or wheelchair van (i e , seated during transport without a medical attendant or monitoring)? No    4) In addition to completing questions 1-3 above, please check any of the following conditions that apply*:   *Note: supporting documentation for any boxes checked must be maintained in the patient's medical records  If hosp-hosp transfer, describe services needed at 2nd facility not available at 1st facility? Moderate/severe pain on movement   Medical attendant required   Unable to tolerate seated position for time needed to transport       Section III - Signature of Physician or Healthcare Professional  I certify that the above information is true and correct based on my evaluation of this patient, and represent that the patient requires transport by ambulance and that other forms of transport are contraindicated  I understand that this information will be used by the Centers for Medicare and Medicaid Services (CMS) to support the determination of medical necessity for ambulance services, and I represent that I have personal knowledge of the patient's condition at time of transport  []  If this box is checked, I also certify that the patient is physically or mentally incapable of signing the ambulance service's claim and that the institution with which I am affiliated has furnished care, services, or assistance to the patient  My signature below is made on behalf of the patient pursuant to 42 CFR §424 36(b)(4)   In accordance with 42 CFR §424 37, the specific reason(s) that the patient is physically or mentally incapable of signing the claim form is as follows: N/A       Signature of Physician* or Healthcare Professional______________________________________________________________  Signature Date 09/27/21 (For scheduled repetitive transports, this form is not valid for transports performed more than 60 days after this date)    Printed Name & Credentials of Physician or Healthcare Professional (MD, , RN, etc )______David Burch __________________________  *Form must be signed by patient's attending physician for scheduled, repetitive transports   For non-repetitive, unscheduled ambulance transports, if unable to obtain the signature of the attending physician, any of the following may sign (choose appropriate option below)  [] Physician Assistant []  Clinical Nurse Specialist []  Registered Nurse  []  Nurse Practitioner  [x] Discharge Planner

## 2021-09-27 NOTE — UTILIZATION REVIEW
Initial Clinical Review    Admission: Date/Time/Statement:   Admission Orders (From admission, onward)     Ordered        09/25/21 1841  Inpatient Admission  Once                   Orders Placed This Encounter   Procedures    Inpatient Admission     Standing Status:   Standing     Number of Occurrences:   1     Order Specific Question:   Level of Care     Answer:   Med Surg [16]     Order Specific Question:   Estimated length of stay     Answer:   More than 2 Midnights     Order Specific Question:   Certification     Answer:   I certify that inpatient services are medically necessary for this patient for a duration of greater than two midnights  See H&P and MD Progress Notes for additional information about the patient's course of treatment  ED Arrival Information     Expected Arrival Acuity    - 9/25/2021 16:11 Urgent         Means of arrival Escorted by Service Admission type    Wheelchair Spouse Hospitalist Urgent         Arrival complaint    DIFF SWALLOWING        Chief Complaint   Patient presents with    Difficulty Swallowing     pt arrives reporting increasing difficulty with swallowing over the past two weeks  family reports he is now stating that it is too painful to eat or drink  Initial Presentation:80year old male to the ED from home with complaints of difficulty swallowing for the past 2 weeks with decreased po intake  Admitted to inpatient for dysphagia, acute renal failure, hypotension  CUrrent smoker, h/o kidney disease  Has chronic cough  Arrives appearing chronically ill with posterior oropharyngeal edema, decreased breath sounds and wheezing  Creat and WBCs elevated  CT soft tissue neck shows no acute abnormality  Troponin neg  Gentle iv fluids started  Suspect ARF due to decreased po intake, diuretic/ace inhibitor use  Nephrology consult  Keep NPO  Check barium swallow study  hgb baseline around 13, now it is 10  Date: 9/26   Day 2: Recheck BMP, trend CReat  Keep NPO  Continue with gentle ivfluids  BP in the 80s the day prior  Continue isosorbide, carvedilol, ticagrelor  Decrease dose of Coreg and isosorbide due to hypotension  Hgb dropped further  Check Stool for blood  COntinue with iron  UPdate 9/26:  Heme + stool  Plan for EGD  Nephrology consult:  TRACEE on CKD  Etiology likely prerenal azotemia in the setting of volume depletion, +/- failure to autoregulate in the setting of ramipril and spironolactone use  Check renal US  Continue with gentle volume expansion with IV fluids  Hold LAsix       ED Triage Vitals   Temperature Pulse Respirations Blood Pressure SpO2   09/25/21 1621 09/25/21 1621 09/25/21 1621 09/25/21 1621 09/25/21 1621   97 5 °F (36 4 °C) 66 18 (!) 94/44 100 %      Temp Source Heart Rate Source Patient Position - Orthostatic VS BP Location FiO2 (%)   09/25/21 1621 09/26/21 0400 09/26/21 0400 09/26/21 0400 --   Temporal Monitor Lying Left arm       Pain Score       09/25/21 1621       Worst Possible Pain          Wt Readings from Last 1 Encounters:   09/25/21 61 3 kg (135 lb 2 3 oz)     Additional Vital Signs:   Patient Position - Orthostatic VS                  09/26/21 1950  --  84  17  --  --  96 %  -- --   09/26/21 1816  97 8 °F (36 6 °C)  88  20  123/54  78  99 %  None (Room air) Sitting   09/26/21 1623  --  85  --  105/51  --  97 %  None (Room air) Lying   09/26/21 0430  --  76  19  130/60  --  98 %  None (Room air) Lying   09/26/21 0400  --  74  19  130/60  --  98 %  None (Room air) Lying   09/25/21 2130  --  79  21  108/43Abnormal   62  98 %  -- --   09/25/21 2100  --  80  22  105/56  72  95 %  None (Room air) --   09/25/21 2015  --  74  20  117/53  76  98 %  -- --   09/25/21 1945  --  71  19  124/83  98  99 %  -- --   09/25/21 1930  --  66  20  85/42Abnormal   61  98 %  -- --   09/25/21 1915  --  66  21  96/47Abnormal   68  97 %  -- --   09/25/21 1845  --  65  19  103/51  73  97 %  -- --   09/25/21 1715  --  65  18  104/52  75  100 %  -- -- 09/25/21 1700  --  66  --  90/45Abnormal   65  99 %  -- --   09/25/21 1630  --  67  17  86/42Abnormal   60  100 %  -- --   09/25/21 1621  97 5 °F (36 4 °C)  66  18  94/44Abnormal   --  100 %  -- --      Weights (last 14 days)    Date/Time  Weight  Height   09/25/21 1621  61 3 kg (135 lb 2 3 oz)  5' 9" (1 753 m)          Pertinent Labs/Diagnostic Test Results:   9/26 CT c/A/P: No evidence of acute pathology throughout the chest, abdomen or pelvis     9/26 US kidney/bladder:   No evidence of obstruction  9/25 CT soft tissue necK: No acute CT findings  Emphysematous lung changes  9/26 CXR: No acute cardiopulmonary disease     9/25 EKG: Normal sinus rhythm  Left bundle branch block  Abnormal ECG  No previous ECGs available      Results from last 7 days   Lab Units 09/26/21  2224 09/26/21  0539 09/25/21  1655   WBC Thousand/uL  --  9 26 14 01*   HEMOGLOBIN g/dL 8 9* 8 5* 10 0*   HEMATOCRIT % 27 8* 26 0* 30 7*   PLATELETS Thousands/uL  --  216 276   NEUTROS ABS Thousands/µL  --  7 77* 11 89*         Results from last 7 days   Lab Units 09/26/21  0539 09/25/21  1655   SODIUM mmol/L 142 138   POTASSIUM mmol/L 4 4 4 8   CHLORIDE mmol/L 109* 104   CO2 mmol/L 21 22   ANION GAP mmol/L 12 12   BUN mg/dL 66* 78*   CREATININE mg/dL 3 46* 4 41*   EGFR ml/min/1 73sq m 16 12   CALCIUM mg/dL 7 4* 8 1*   MAGNESIUM mg/dL  --   --      Results from last 7 days   Lab Units 09/26/21  0539 09/25/21  1655   AST U/L 14 16   ALT U/L 18 24   ALK PHOS U/L 77 96   TOTAL PROTEIN g/dL 5 1* 6 2*   ALBUMIN g/dL 2 3* 2 9*   TOTAL BILIRUBIN mg/dL 0 21 0 33     Results from last 7 days   Lab Units 09/26/21  1755 09/26/21  1725 09/26/21  1229 09/26/21  0905 09/26/21  0820 09/26/21  0636   POC GLUCOSE mg/dl 63* 62* 57* 104 145* 58*     Results from last 7 days   Lab Units 09/26/21  0539 09/25/21  1655   GLUCOSE RANDOM mg/dL 81 82         Results from last 7 days   Lab Units 09/26/21  0411   HEMOGLOBIN A1C % 6 1*   EAG mg/dl 128     Results from last 7 days   Lab Units 09/25/21  1655   TROPONIN I ng/mL <0 02       Results from last 7 days   Lab Units 09/25/21  1655   LACTIC ACID mmol/L 0 9       Results from last 7 days   Lab Units 09/26/21  0539   FERRITIN ng/mL 103         Results from last 7 days   Lab Units 09/26/21  0744   CLARITY UA  Clear   COLOR UA  Yellow   SPEC GRAV UA  1 020   PH UA  5 0   GLUCOSE UA mg/dl Negative   KETONES UA mg/dl Negative   BLOOD UA  Negative   PROTEIN UA mg/dl Negative   NITRITE UA  Negative   BILIRUBIN UA  Negative   UROBILINOGEN UA E U /dl 0 2   LEUKOCYTES UA  Negative   WBC UA /hpf 0-1*   RBC UA /hpf 1-2*   BACTERIA UA /hpf Occasional   EPITHELIAL CELLS WET PREP /hpf Occasional   SODIUM UR  51   CREATININE UR mg/dL 92 5     ED Treatment:   Medication Administration from 09/25/2021 1606 to 09/27/2021 0802       Date/Time Order Dose Route Action     09/25/2021 1654 sodium chloride 0 9 % bolus 1,000 mL 1,000 mL Intravenous New Bag     09/25/2021 1942 sodium chloride 0 9 % bolus 500 mL 500 mL Intravenous New Bag     09/26/2021 0848 allopurinol (ZYLOPRIM) tablet 100 mg 100 mg Oral Given     09/26/2021 0848 aspirin (ECOTRIN LOW STRENGTH) EC tablet 81 mg 81 mg Oral Given     09/26/2021 0749 carvedilol (COREG) tablet 25 mg 25 mg Oral Given     09/26/2021 0848 isosorbide mononitrate (IMDUR) 24 hr tablet 120 mg 120 mg Oral Given     09/26/2021 2118 mirtazapine (REMERON) tablet 7 5 mg 7 5 mg Oral Given     09/26/2021 0849 mirtazapine (REMERON) tablet 7 5 mg 7 5 mg Oral Given     09/26/2021 2120 ticagrelor tablet 60 mg 60 mg Oral Given     09/26/2021 1329 ticagrelor tablet 60 mg 60 mg Oral Given     09/26/2021 2120 ipratropium (ATROVENT HFA) inhaler 1 puff 1 puff Inhalation Given     09/26/2021 0849 ipratropium (ATROVENT HFA) inhaler 1 puff 1 puff Inhalation Given     09/26/2021 0637 heparin (porcine) subcutaneous injection 5,000 Units 5,000 Units Subcutaneous Given     09/26/2021 0402 multi-electrolyte (ISOLYTE-S PH 7 4 equivalent) IV solution 50 mL/hr Intravenous New Bag     09/26/2021 0749 dextrose 50 % IV solution 25 mL 25 mL Intravenous Given     09/26/2021 2120 pantoprazole (PROTONIX) injection 40 mg 40 mg Intravenous Given     09/26/2021 1154 pantoprazole (PROTONIX) injection 40 mg 40 mg Intravenous Given     09/26/2021 1808 glucose chewable tablet 16 g 16 g Oral Given     09/26/2021 1841 dextrose 5 % and sodium chloride 0 9 % infusion 50 mL/hr Intravenous New Bag     09/26/2021 1830 dextrose 50 % IV solution 25 mL 25 mL Intravenous Given        Past Medical History:   Diagnosis Date    [facility]congest[facility] failure[facility])     COPD (chronic obstructive pulmonary disease) ([facility])     Coronary artery disease     Diabetes mellitus ([place] [address] )     Gout     High cholesterol     Hypertension     Renal disorder        Admitting Diagnosis: TRACEE (acute kidney injury) ([place] [address] ) [N17 9]  Difficulty swallowing [R13 10]  Dysphagia [R13 10]  Protein-calorie malnutrition, unspecified severity ([place] [address] ) [E46]  Age/Sex: 80 y o  male  Admission Orders:  Scheduled Medications:  allopurinol, 100 mg, Oral, Daily  carvedilol, 6 25 mg, Oral, BID With Meals  ipratropium, 1 puff, Inhalation, Q12H [address]  isosorbide mononitrate, 60 mg, Oral, Daily  mirtazapine, 7 5 mg, Oral, BID  pantoprazole, 40 mg, Intravenous, Q12H [address]      Continuous IV Infusions:  dextrose 5 % and sodium chloride 0 9 %, 25 mL/hr, Intravenous, Continuous      PRN Meds:  acetaminophen, 650 mg, Oral, Q6H PRN  albuterol, 2 puff, Inhalation, Q6H PRN  albuterol, 2 5 mg, Nebulization, Q6H PRN        IP CONSULT TO NUTRITION SERVICES  IP CONSULT TO NEPHROLOGY  IP CONSULT TO GASTROENTEROLOGY    Network Utilization Review Department  ATTENTION: Please call with any questions or concerns to [phone] and carefully listen to the prompts so that you are directed to the right person   All voicemails are confidential   Ragini Dull all requests for admission clinical reviews, approved or denied determinations and any other requests to dedicated fax number below belonging to the campus where the patient is receiving treatment   List of dedicated fax numbers for the Facilities:  [address] [place] [address] DENIALS (Administrative/Medical Necessity) [phone]   [address] (Maternity/NICU/Pediatrics) [phone]   [address] [place] [address] [facility] [id number] [place] [address] [address] [facility] [place] [address] [phone]

## 2021-09-27 NOTE — ASSESSMENT & PLAN NOTE
· Blood pressure is alone arrival   Now blood pressure is normal   · Antihypertensive medication doses decreased

## 2021-09-28 LAB
HEMOCCULT STL QL: ABNORMAL
HEMOCCULT STL QL: POSITIVE
HEMOCCULT STL QL: POSITIVE

## 2021-09-28 NOTE — UTILIZATION REVIEW
Notification of Discharge   This is a Notification of Discharge from our facility [address]  Please be advised that this patient has been discharge from our facility  Below you will find the admission and discharge date and time including the patients disposition  UTILIZATION REVIEW CONTACT:  Jael Sheppard  Utilization   Network Utilization Review Department  Phone: [phone] x carefully listen to the prompts  All voicemails are confidential   Email: [email or web address]     PHYSICIAN ADVISORY SERVICES:  FOR [id number] REVIEW - MEDICAL NECESSITY DENIAL  Phone: [phone]  Fax: [phone]  Email: [email or web address]     PRESENTATION DATE: 9/25/2021  4:16 PM  OBERVATION ADMISSION DATE:  INPATIENT ADMISSION DATE: 9/25/21  6:42 PM   DISCHARGE DATE: 9/27/2021  9:01 PM  DISPOSITION: Non [facility] Non [facility]      IMPORTANT INFORMATION:  Send all requests for admission clinical reviews, approved or denied determinations and any other requests to dedicated fax number below belonging to the campus where the patient is receiving treatment   List of dedicated fax numbers:  [address] [place] [address] DENIALS (Administrative/Medical Necessity) [phone]   [address]  (Maternity/NICU/Pediatrics) [phone]   Rinda Purple [phone]   Vishnu Gouge [phone]   John Brine [phone]   [address] [phone]   [facility]  [phone]   [address] [facility] [address]  [address] [facility] [address] [facility] [phone]

## 2021-09-28 NOTE — PROGRESS NOTES
Report called and given to [facility]  Pt sent with cell phone and one medication from home [facility])  Wife took the rest of pt belongings home with her  Pt transported via stretcher  Picked up around 2035

## 2021-09-28 NOTE — PLAN OF CARE
Problem: Potential for Falls  Goal: Patient will remain free of falls  Description: INTERVENTIONS:  - Educate patient/family on patient safety including physical limitations  - Instruct patient to call for assistance with activity   - Consult OT/PT to assist with strengthening/mobility   - Keep Call bell within reach  - Keep bed low and locked with side rails adjusted as appropriate  - Keep care items and personal belongings within reach  - Initiate and maintain comfort rounds  - Make Fall Risk Sign visible to staff  - Apply yellow socks and bracelet for high fall risk patients  - Consider moving patient to room near nurses station  Outcome: Progressing     Problem: Nutrition/Hydration-ADULT  Goal: Nutrient/Hydration intake appropriate for improving, restoring or maintaining nutritional needs  Description: Monitor and assess patient's nutrition/hydration status for malnutrition  Collaborate with interdisciplinary team and initiate plan and interventions as ordered  Monitor patient's weight and dietary intake as ordered or per policy  Utilize nutrition screening tool and intervene as necessary  Determine patient's food preferences and provide high-protein, high-caloric foods as appropriate       INTERVENTIONS:  - Monitor oral intake, urinary output, labs, and treatment plans  - Assess nutrition and hydration status and recommend course of action  - Evaluate amount of meals eaten  - Assist patient with eating if necessary   - Allow adequate time for meals  - Recommend/ encourage appropriate diets, oral nutritional supplements, and vitamin/mineral supplements  - Order, calculate, and assess calorie counts as needed  - Recommend, monitor, and adjust tube feedings and TPN/PPN based on assessed needs  - Assess need for intravenous fluids  - Provide specific nutrition/hydration education as appropriate  - Include patient/family/caregiver in decisions related to nutrition  Outcome: Progressing     Problem: MOBILITY - ADULT  Goal: Maintain or return to baseline ADL function  Description: INTERVENTIONS:  -  Assess patient's ability to carry out ADLs; assess patient's baseline for ADL function and identify physical deficits which impact ability to perform ADLs (bathing, care of mouth/teeth, toileting, grooming, dressing, etc )  - Assess/evaluate cause of self-care deficits   - Assess range of motion  - Assess patient's mobility; develop plan if impaired  - Assess patient's need for assistive devices and provide as appropriate  - Encourage maximum independence but intervene and supervise when necessary  - Involve family in performance of ADLs  - Assess for home care needs following discharge   - Consider OT consult to assist with ADL evaluation and planning for discharge  - Provide patient education as appropriate  Outcome: Progressing  Goal: Maintains/Returns to pre admission functional level  Description: INTERVENTIONS:  - Perform BMAT or MOVE assessment daily    - Set and communicate daily mobility goal to care team and patient/family/caregiver     - Collaborate with rehabilitation services on mobility goals if consulted  - Out of bed for toileting  - Record patient progress and toleration of activity level   Outcome: Progressing

## 2021-09-28 NOTE — QUICK NOTE
I personally called and spoke with the nurse, Eunice Krishnamurthy  Who iss taking care of this patient at [facility], by calling at [phone]  I was asking the nurse to connect me with  the attending provider  As per, Eunice Krishnamurthy the attending provider is Dr Lisa Mcdaniels for call back from the attending provider to discuss the imaging report  I also gave the imaging report to nurse Eunice Krishnamurthy that as per CT scan of abdomen and pelvis, it was suspecting sigmoid colon mass  @10:33 am  Received a call from Dr Jai Clemente  I discussed the CT abdomen/pelvis results in details with him